# Patient Record
Sex: MALE | Race: WHITE | NOT HISPANIC OR LATINO | Employment: FULL TIME | URBAN - METROPOLITAN AREA
[De-identification: names, ages, dates, MRNs, and addresses within clinical notes are randomized per-mention and may not be internally consistent; named-entity substitution may affect disease eponyms.]

---

## 2024-09-25 ENCOUNTER — HOSPITAL ENCOUNTER (EMERGENCY)
Facility: CLINIC | Age: 34
Discharge: HOME | End: 2024-09-25
Attending: EMERGENCY MEDICINE | Admitting: EMERGENCY MEDICINE
Payer: COMMERCIAL

## 2024-09-25 VITALS
TEMPERATURE: 97.8 F | RESPIRATION RATE: 16 BRPM | WEIGHT: 315 LBS | OXYGEN SATURATION: 99 % | HEIGHT: 72 IN | HEART RATE: 80 BPM | DIASTOLIC BLOOD PRESSURE: 70 MMHG | SYSTOLIC BLOOD PRESSURE: 116 MMHG | BODY MASS INDEX: 42.66 KG/M2

## 2024-09-25 DIAGNOSIS — I48.91 ATRIAL FIBRILLATION WITH RAPID VENTRICULAR RESPONSE (H): ICD-10-CM

## 2024-09-25 LAB
ANION GAP SERPL CALCULATED.3IONS-SCNC: 13 MMOL/L (ref 7–15)
ATRIAL RATE - MUSE: 92 BPM
BASOPHILS # BLD AUTO: 0 10E3/UL (ref 0–0.2)
BASOPHILS NFR BLD AUTO: 0 %
BUN SERPL-MCNC: 19 MG/DL (ref 6–20)
CALCIUM SERPL-MCNC: 9 MG/DL (ref 8.8–10.4)
CHLORIDE SERPL-SCNC: 107 MMOL/L (ref 98–107)
CREAT SERPL-MCNC: 1.08 MG/DL (ref 0.67–1.17)
DIASTOLIC BLOOD PRESSURE - MUSE: NORMAL MMHG
EGFRCR SERPLBLD CKD-EPI 2021: >90 ML/MIN/1.73M2
EOSINOPHIL # BLD AUTO: 0.1 10E3/UL (ref 0–0.7)
EOSINOPHIL NFR BLD AUTO: 1 %
ERYTHROCYTE [DISTWIDTH] IN BLOOD BY AUTOMATED COUNT: 12.6 % (ref 10–15)
GLUCOSE SERPL-MCNC: 101 MG/DL (ref 70–99)
HCO3 SERPL-SCNC: 18 MMOL/L (ref 22–29)
HCT VFR BLD AUTO: 45.1 % (ref 40–53)
HGB BLD-MCNC: 14.9 G/DL (ref 13.3–17.7)
IMM GRANULOCYTES # BLD: 0.1 10E3/UL
IMM GRANULOCYTES NFR BLD: 1 %
INTERPRETATION ECG - MUSE: NORMAL
LYMPHOCYTES # BLD AUTO: 3.1 10E3/UL (ref 0.8–5.3)
LYMPHOCYTES NFR BLD AUTO: 32 %
MAGNESIUM SERPL-MCNC: 1.9 MG/DL (ref 1.7–2.3)
MCH RBC QN AUTO: 28 PG (ref 26.5–33)
MCHC RBC AUTO-ENTMCNC: 33 G/DL (ref 31.5–36.5)
MCV RBC AUTO: 85 FL (ref 78–100)
MONOCYTES # BLD AUTO: 0.8 10E3/UL (ref 0–1.3)
MONOCYTES NFR BLD AUTO: 9 %
NEUTROPHILS # BLD AUTO: 5.6 10E3/UL (ref 1.6–8.3)
NEUTROPHILS NFR BLD AUTO: 58 %
NRBC # BLD AUTO: 0 10E3/UL
NRBC BLD AUTO-RTO: 0 /100
P AXIS - MUSE: NORMAL DEGREES
PLATELET # BLD AUTO: 192 10E3/UL (ref 150–450)
POTASSIUM SERPL-SCNC: 4.2 MMOL/L (ref 3.4–5.3)
PR INTERVAL - MUSE: NORMAL MS
QRS DURATION - MUSE: 86 MS
QT - MUSE: 292 MS
QTC - MUSE: 378 MS
R AXIS - MUSE: 42 DEGREES
RBC # BLD AUTO: 5.33 10E6/UL (ref 4.4–5.9)
SODIUM SERPL-SCNC: 138 MMOL/L (ref 135–145)
SYSTOLIC BLOOD PRESSURE - MUSE: NORMAL MMHG
T AXIS - MUSE: 21 DEGREES
T4 FREE SERPL-MCNC: 0.94 NG/DL (ref 0.9–1.7)
TROPONIN T SERPL HS-MCNC: 6 NG/L
TROPONIN T SERPL HS-MCNC: 7 NG/L
TSH SERPL DL<=0.005 MIU/L-ACNC: 27.1 UIU/ML (ref 0.3–4.2)
VENTRICULAR RATE- MUSE: 101 BPM
WBC # BLD AUTO: 9.8 10E3/UL (ref 4–11)

## 2024-09-25 PROCEDURE — 99284 EMERGENCY DEPT VISIT MOD MDM: CPT | Mod: 25

## 2024-09-25 PROCEDURE — 83735 ASSAY OF MAGNESIUM: CPT | Performed by: EMERGENCY MEDICINE

## 2024-09-25 PROCEDURE — 96361 HYDRATE IV INFUSION ADD-ON: CPT

## 2024-09-25 PROCEDURE — 84484 ASSAY OF TROPONIN QUANT: CPT | Performed by: EMERGENCY MEDICINE

## 2024-09-25 PROCEDURE — 258N000003 HC RX IP 258 OP 636: Performed by: EMERGENCY MEDICINE

## 2024-09-25 PROCEDURE — 36415 COLL VENOUS BLD VENIPUNCTURE: CPT | Performed by: EMERGENCY MEDICINE

## 2024-09-25 PROCEDURE — 84439 ASSAY OF FREE THYROXINE: CPT | Performed by: EMERGENCY MEDICINE

## 2024-09-25 PROCEDURE — 80048 BASIC METABOLIC PNL TOTAL CA: CPT | Performed by: EMERGENCY MEDICINE

## 2024-09-25 PROCEDURE — 85004 AUTOMATED DIFF WBC COUNT: CPT | Performed by: EMERGENCY MEDICINE

## 2024-09-25 PROCEDURE — 250N000009 HC RX 250: Performed by: EMERGENCY MEDICINE

## 2024-09-25 PROCEDURE — 96376 TX/PRO/DX INJ SAME DRUG ADON: CPT

## 2024-09-25 PROCEDURE — 96374 THER/PROPH/DIAG INJ IV PUSH: CPT

## 2024-09-25 PROCEDURE — 93005 ELECTROCARDIOGRAM TRACING: CPT | Performed by: EMERGENCY MEDICINE

## 2024-09-25 PROCEDURE — 84443 ASSAY THYROID STIM HORMONE: CPT | Performed by: EMERGENCY MEDICINE

## 2024-09-25 RX ORDER — METOPROLOL TARTRATE 1 MG/ML
5 INJECTION, SOLUTION INTRAVENOUS
Status: DISPENSED | OUTPATIENT
Start: 2024-09-25 | End: 2024-09-25

## 2024-09-25 RX ORDER — METOPROLOL TARTRATE 25 MG/1
25 TABLET, FILM COATED ORAL DAILY
Qty: 7 TABLET | Refills: 0 | Status: SHIPPED | OUTPATIENT
Start: 2024-09-25 | End: 2024-10-02

## 2024-09-25 RX ADMIN — METOPROLOL TARTRATE 5 MG: 5 INJECTION INTRAVENOUS at 04:24

## 2024-09-25 RX ADMIN — METOPROLOL TARTRATE 5 MG: 5 INJECTION INTRAVENOUS at 04:37

## 2024-09-25 RX ADMIN — METOPROLOL TARTRATE 5 MG: 5 INJECTION INTRAVENOUS at 05:07

## 2024-09-25 RX ADMIN — SODIUM CHLORIDE 1000 ML: 9 INJECTION, SOLUTION INTRAVENOUS at 03:41

## 2024-09-25 ASSESSMENT — ACTIVITIES OF DAILY LIVING (ADL)
ADLS_ACUITY_SCORE: 35

## 2024-09-25 ASSESSMENT — COLUMBIA-SUICIDE SEVERITY RATING SCALE - C-SSRS
1. IN THE PAST MONTH, HAVE YOU WISHED YOU WERE DEAD OR WISHED YOU COULD GO TO SLEEP AND NOT WAKE UP?: NO
6. HAVE YOU EVER DONE ANYTHING, STARTED TO DO ANYTHING, OR PREPARED TO DO ANYTHING TO END YOUR LIFE?: NO
2. HAVE YOU ACTUALLY HAD ANY THOUGHTS OF KILLING YOURSELF IN THE PAST MONTH?: NO

## 2024-09-25 ASSESSMENT — ENCOUNTER SYMPTOMS
PALPITATIONS: 1
DIZZINESS: 1
SHORTNESS OF BREATH: 1

## 2024-09-25 NOTE — ED TRIAGE NOTES
Pt arrives to ed with c/o cp, sob and feels like heart is fluttering has hx of afib. Denies nausea. Has been going on for 3 days.      Triage Assessment (Adult)       Row Name 09/25/24 0305          Triage Assessment    Airway WDL WDL        Respiratory WDL    Respiratory WDL X;rhythm/pattern     Rhythm/Pattern, Respiratory shortness of breath        Skin Circulation/Temperature WDL    Skin Circulation/Temperature WDL WDL        Cardiac WDL    Cardiac WDL X;chest pain        Chest Pain Assessment    Chest Pain Location midsternal     Character stabbing     Precipitating Factors at rest        Cognitive/Neuro/Behavioral WDL    Cognitive/Neuro/Behavioral WDL WDL

## 2024-09-25 NOTE — DISCHARGE INSTRUCTIONS
I did place a referral for you for cardiology.  I did also give you the phone number above for the rapid access cardiac clinic.  Is important that you follow-up with them in the next 1 to 2 days.  You should be getting a call from cardiology to establish follow-up as well if you are unable to get through to the rapid access cardiac clinic.  Take the medications prescribed once a day.  Certainly if your palpitations get worse or you become more symptomatic at any time you should return to the emergency department for reevaluation.

## 2024-09-25 NOTE — PROGRESS NOTES
Patient discharged home with AVS. Vitals stable on RA. Will follow up with cardiology at discharge. Discharged home with wife.

## 2024-09-25 NOTE — ED PROVIDER NOTES
EMERGENCY DEPARTMENT ENCOUNTER      NAME: Eduin Pitts  AGE: 33 year old male  YOB: 1990  MRN: [id number]  EVALUATION DATE & TIME: No admission date for patient encounter.    PCP: Bulmaro Sanchez    ED PROVIDER: Candace Jeffries M.D.      Chief Complaint   Patient presents with    Chest Pain    Shortness of Breath         FINAL IMPRESSION:  1. Atrial fibrillation with rapid ventricular response (H)        MEDICAL DECISION MAKING:    Pertinent Labs & Imaging studies reviewed. (See chart for details)  ED Course as of 09/25/24 0620   Wed Sep 25, 2024   0314 Afebrile.  Vital signs here with tachycardia.  Patient is coming in with chest discomfort, palpitations, intermittent shortness of breath.  Ongoing for the last 3 days.  History of A-fib, last round was 2 years ago.  First diagnosed back in 2013.   0316 Reviewed ED's visit for A-fib from 8/24/2013.  States he has not seen cardiology.  Says normally when he gets these episodes that last for a day and it goes away on its own.  Not on any current medications.  No caffeine intake.    Exam for patient here shows tachycardia with irregularly irregular rhythm.  Otherwise unremarkable.    Patient's EKG here shows A-fib with RVR with multiple PVCs.  Rate 101.  No ST elevations or depressions.  Does have T wave inversions noted in lead III.  QTc here is 378, QRS 86.  As compared with previous EKG from 8/24/2013 A-fib at that time at a rate of 88.  T wave inversions in lead III were not present.  No PVCs noted at that time.   0436 Patient's labs back here with significant elevated TSH otherwise labs, electrolytes within normal limits.   0437 Given 1 dose of metoprolol, heart rate down to the 80s but still remains in atrial fibrillation.   0610 Patient's heart rate now more appropriate but continues with atrial fibrillation.  Did set up for a rapid access cardiac referral in the next 1 to 2 days.  His symptoms have resolved with his rate control.  He is  hemodynamically stable.  [id number] 0.    0612 Will plan to is appropriate with metoprolol and emergent cardiology follow-up.  Overall here he looks well, is not in need of urgent anticoagulations, symptoms have improved.  Do not feel he requires urgent hospitalization for issue but will require urgent follow-up with cardiology.         Medical Decision Making  Obtained supplemental history:Supplemental history obtained?: Documented in chart  Reviewed external records: External records reviewed?: Documented in chart  Care impacted by chronic illness:Other: Atrial fibrillation  Care significantly affected by social determinants of health:Access to Medical Care  Did you consider but not order tests?: Work up considered but not performed and documented in chart, if applicable  Did you interpret images independently?: Independent interpretation of ECG and images noted in documentation, when applicable.  Consultation discussion with other provider:Did you involve another provider (consultant, , pharmacy, etc.)?: No  Discharge. I prescribed additional prescription strength medication(s) as charted. I considered admission, but discharged the patient after share decision making conversation.  Not Applicable        Critical care: 0 minutes excluding separately billable procedures.  Includes bedside management, time reviewing test results, review of records, discussing the case with staff, documenting the medical record and time spent with family members (or surrogate decision makers) discussing specific treatment issues.          ED COURSE:  3:09 AM I met with the patient, obtained history, performed an initial exam, and discussed options and plan for diagnostics and treatment here in the ED.    The importance of close follow up was discussed. We reviewed warning signs and symptoms, and I instructed Mr. Pitts to return to the emergency department immediately if he develops any new or worsening symptoms. I provided  additional verbal discharge instructions. Mr. Pitts expressed understanding and agreement with this plan of care, his questions were answered, and he was discharged in stable condition.     MEDICATIONS GIVEN IN THE EMERGENCY:  Medications   metoprolol (LOPRESSOR) injection 5 mg (5 mg Intravenous $Given 9/25/24 0437)   metoprolol (LOPRESSOR) injection 5 mg (5 mg Intravenous $Given 9/25/24 0507)   sodium chloride 0.9% BOLUS 1,000 mL (0 mLs Intravenous Stopped 9/25/24 0506)       NEW PRESCRIPTIONS STARTED AT TODAY'S ER VISIT:  New Prescriptions    METOPROLOL TARTRATE (LOPRESSOR) 25 MG TABLET    Take 1 tablet (25 mg) by mouth daily for 7 days.          =================================================================    HPI    Patient information was obtained from: Patient    Use of : N/A         Eduin Pitts is a 33 year old male with a history of atrial fibrillation who presents to the ED via walk-in for the evaluation of chest pain and shortness of breath.    Patient reports palpitations that started three days ago. He reports associating shortness of breath, dizziness, and intermittent chest pain. He notes a history of atrial fibrillation. He is not currently on any medications for this. Patient states that his last episode of atrial fibrillation was about two years ago. He mentions that typically, his episodes will resolve within the day. However, current symptoms have persisted, prompting ED visit. Otherwise, he denies any caffeine use. No other complaints at this time.    REVIEW OF SYSTEMS   Review of Systems   Respiratory:  Positive for shortness of breath.    Cardiovascular:  Positive for chest pain and palpitations.   Neurological:  Positive for dizziness.   All other systems reviewed and are negative.    PAST MEDICAL HISTORY:  History reviewed. No pertinent past medical history.    PAST SURGICAL HISTORY:  History reviewed. No pertinent surgical history.    CURRENT MEDICATIONS:    No  current facility-administered medications for this encounter.    Current Outpatient Medications:     metoprolol tartrate (LOPRESSOR) 25 MG tablet, Take 1 tablet (25 mg) by mouth daily for 7 days., Disp: 7 tablet, Rfl: 0    ALLERGIES:  No Known Allergies    FAMILY HISTORY:  History reviewed. No pertinent family history.    SOCIAL HISTORY:   Social History     Socioeconomic History    Marital status: Single   Tobacco Use    Smoking status: Never    Smokeless tobacco: Never   Substance and Sexual Activity    Alcohol use: No    Drug use: No       PHYSICAL EXAM:    Vitals: /80   Pulse 82   Temp 97.8  F (36.6  C) (Oral)   Resp 23   Ht 1.829 m (6')   Wt (!) 172.4 kg (380 lb)   SpO2 97%   BMI 51.54 kg/m     General:. Alert and interactive, comfortable appearing.  HENT: Oropharynx without erythema or exudates. MMM.  TMs clear bilaterally.  Eyes: Pupils mid-sized and equally reactive.   Neck: Full AROM.  No midline tenderness to palpation.  Cardiovascular: Tachycardia with irregularly irregular rhythm. Peripheral pulses 2+ bilaterally.  Chest/Pulmonary: Normal work of breathing. Lung sounds clear and equal throughout, no wheezes or crackles. No chest wall tenderness or deformities.  Abdomen: Soft, nondistended. Nontender without guarding or rebound.  Back/Spine: No CVA or midline tenderness.  Extremities: Normal ROM of all major joints. No lower extremity edema.   Skin: Warm and dry. Normal skin color.   Neuro: Speech clear. CNs grossly intact. Moves all extremities appropriately. Strength and sensation grossly intact to all extremities.   Psych: Normal affect/mood, cooperative, memory appropriate.     LAB:  All pertinent labs reviewed and interpreted.  Labs Ordered and Resulted from Time of ED Arrival to Time of ED Departure   BASIC METABOLIC PANEL - Abnormal       Result Value    Sodium 138      Potassium 4.2      Chloride 107      Carbon Dioxide (CO2) 18 (*)     Anion Gap 13      Urea Nitrogen 19.0       Creatinine 1.08      GFR Estimate >90      Calcium 9.0      Glucose 101 (*)    TSH WITH FREE T4 REFLEX - Abnormal    TSH 27.10 (*)    TROPONIN T, HIGH SENSITIVITY - Normal    Troponin T, High Sensitivity 7     MAGNESIUM - Normal    Magnesium 1.9     T4 FREE - Normal    Free T4 0.94     TROPONIN T, HIGH SENSITIVITY - Normal    Troponin T, High Sensitivity 6     CBC WITH PLATELETS AND DIFFERENTIAL    WBC Count 9.8      RBC Count 5.33      Hemoglobin 14.9      Hematocrit 45.1      MCV 85      MCH 28.0      MCHC 33.0      RDW 12.6      Platelet Count 192      % Neutrophils 58      % Lymphocytes 32      % Monocytes 9      % Eosinophils 1      % Basophils 0      % Immature Granulocytes 1      NRBCs per 100 WBC 0      Absolute Neutrophils 5.6      Absolute Lymphocytes 3.1      Absolute Monocytes 0.8      Absolute Eosinophils 0.1      Absolute Basophils 0.0      Absolute Immature Granulocytes 0.1      Absolute NRBCs 0.0         RADIOLOGY:  No orders to display       EKG:  See MDM  I have independently reviewed and interpreted the EKG(s) documented above.           I, Denisha Sheppard, am serving as a scribe to document services personally performed by Dr. Candace Jeffries  based on my observation and the provider's statements to me. I, Candace Jeffries MD attest that Denisha Sheppard is acting in a scribe capacity, has observed my performance of the services and has documented them in accordance with my direction.      Candace Jeffries M.D.  Emergency Medicine  [facility] EMERGENCY ROOM  [address] [address]  [phone]  Dept: [phone]     Candace Jeffries MD  09/25/24 0620

## 2024-09-26 ENCOUNTER — OFFICE VISIT (OUTPATIENT)
Dept: CARDIOLOGY | Facility: CLINIC | Age: 34
End: 2024-09-26
Attending: EMERGENCY MEDICINE
Payer: COMMERCIAL

## 2024-09-26 VITALS
SYSTOLIC BLOOD PRESSURE: 120 MMHG | HEIGHT: 72 IN | HEART RATE: 104 BPM | BODY MASS INDEX: 42.66 KG/M2 | OXYGEN SATURATION: 95 % | DIASTOLIC BLOOD PRESSURE: 82 MMHG | WEIGHT: 315 LBS

## 2024-09-26 DIAGNOSIS — I48.91 ATRIAL FIBRILLATION WITH RAPID VENTRICULAR RESPONSE (H): ICD-10-CM

## 2024-09-26 PROCEDURE — 99205 OFFICE O/P NEW HI 60 MIN: CPT | Performed by: INTERNAL MEDICINE

## 2024-09-26 RX ORDER — METOPROLOL SUCCINATE 50 MG/1
50 TABLET, EXTENDED RELEASE ORAL DAILY
Qty: 30 TABLET | Refills: 3 | Status: SHIPPED | OUTPATIENT
Start: 2024-09-26

## 2024-09-26 NOTE — LETTER
"9/26/2024    Physician No Ref-Primary  No address on file    RE: Eduin Pitts       Dear Colleague,     I had the pleasure of seeing Eduin Pitts in the [facility].  HPI and Plan:   I the pleasure of meeting Mr. Pitts today he is a 33-year-old gentleman about to be 34 he is referred in from the emergency room for atrial fibrillation he has a history of atrial fibrillation going back to about 2010 or 2013 he had seen a cardiologist in the past I am not sure of his complete workup I believe he probably had an echocardiogram.  He has had 4 episodes of atrial fib since that time he was just in the emergency room and his episode started probably 4 days before the ER visit.  He was noted to be in somewhat rapid atrial fibrillation because it had been 4 days they did not cardiovert and they gave him a prescription for beta-blocker but he did not start it yet he is then referred and now.  He notes palpitations no dizziness or syncope.  There was no obvious trigger of note reviewing his labs he has compensated hypothyroid so I would refer him to an internist for that but that is probably not contributing to the atrial fib he does have morbid obesity and he has a history of \"mild obstructive sleep apnea\" which may be a trigger he does not drink alcohol to any degree he does note that caffeine sometimes might trigger his palpitations but he does not drink caffeinated products much anymore    On exam today he has morbid obesity which could contribute to sleep apnea and he has significant peripheral edema despite the fact that he had varicose vein ablation.  I think the most expeditious way to treat them is to have him come into the hospital electively his chads vascular score is 0 but since he has been in atrial fibs for more than 4 days now I would recommend we place him on metoprolol XL 50 mg daily and Eliquis.  Will schedule him for transesophageal echo and electrical cardioversion as " indicated.    Will then see him back in about 3 weeks and make sure he is maintaining sinus rhythm.  At some point I would like to do an overnight oximetry just to look for screening test for hypoxia and sleep apnea but rather wait till he is in sinus rhythm will also need to check for coronary artery disease at some point since he states he might have high cholesterol he is not sure how high it was.  The transesophageal echo will obviously be looking for valve function cardiomyopathy etc.    We may opt for the pill in a pocket with flecainide 300 mg as needed and beta-blocker in the future but I want to make sure that the echo shows normal heart function the stress test was negative for ischemia.  As mentioned above we will go and refer him to an internist for his compensated hypothyroid we will get a fasting lipid profile at some future date    Risk-benefit indication for transesophageal echo and cardioversion were discussed with the patient including the shock in the heart rhythm heart pauses. He knows he will be getting anesthesia and has to have his wife present with him that day and he cannot drive afterwards    Complex visit today 1 hour visit we reviewed the ER blood work EKG to history physical exam and outline a complex plan we discussed other options such as prolonged anticoagulation and then cardioversion in 4 weeks if no change we talked about different blood thinners including Coumadin versus Eliquis if Eliquis is not covered we could consider Pradaxa but I like to get him on a medicine that is quicker acting if we plan on doing the cardioversion the next day or 2 and Coumadin would not fit that bill    Orders Placed This Encounter   Procedures     Follow-Up with Cardiology DONELL     EKG 12-lead complete w/read - Clinics (to be scheduled)     Cardioversion     Transesophageal Echocardiogram     Orders Placed This Encounter   Medications     metoprolol succinate ER (TOPROL XL) 50 MG 24 hr tablet     Sig:  Take 1 tablet (50 mg) by mouth daily.     Dispense:  30 tablet     Refill:  3     apixaban ANTICOAGULANT (ELIQUIS ANTICOAGULANT) 5 MG tablet     Sig: Take 1 tablet (5 mg) by mouth 2 times daily.     Dispense:  60 tablet     Refill:  2     There are no discontinued medications.      Encounter Diagnosis   Name Primary?     Atrial fibrillation with rapid ventricular response (H)        CURRENT MEDICATIONS:  Current Outpatient Medications   Medication Sig Dispense Refill     apixaban ANTICOAGULANT (ELIQUIS ANTICOAGULANT) 5 MG tablet Take 1 tablet (5 mg) by mouth 2 times daily. 60 tablet 2     metoprolol succinate ER (TOPROL XL) 50 MG 24 hr tablet Take 1 tablet (50 mg) by mouth daily. 30 tablet 3     metoprolol tartrate (LOPRESSOR) 25 MG tablet Take 1 tablet (25 mg) by mouth daily for 7 days. 7 tablet 0       ALLERGIES   No Known Allergies    PAST MEDICAL HISTORY:  Past Medical History:   Diagnosis Date     Compensated hypothyroidism      Hyperlipidemia LDL goal <130      Morbid obesity (H)      CURT (obstructive sleep apnea)     mild no treatment     Paroxysmal atrial fibrillation (H)        PAST SURGICAL HISTORY:  Past Surgical History:   Procedure Laterality Date     AS EXCISION OF UVULA       LASER ABLATION VEIN VARICOSE Bilateral     vs heat to close bilateral lower ext varicose vein, no h/o DVT     TONSILLECTOMY       wisdom teeth         FAMILY HISTORY:  Family History   Problem Relation Age of Onset     Diabetes Mother        SOCIAL HISTORY:  Social History     Socioeconomic History     Marital status: Single     Spouse name: None     Number of children: None     Years of education: None     Highest education level: None   Tobacco Use     Smoking status: Never     Smokeless tobacco: Never   Substance and Sexual Activity     Alcohol use: Not Currently     Comment: rare     Drug use: No     Comment: caffeine  none       Review of Systems:  Skin:        Eyes:       ENT:       Respiratory:       Cardiovascular:      "  Gastroenterology:      Genitourinary:       Musculoskeletal:       Neurologic:       Psychiatric:       Heme/Lymph/Imm:       Endocrine:         Physical Exam:  Vitals: /82   Pulse 104   Ht 1.829 m (6')   Wt (!) 170.6 kg (376 lb)   SpO2 95%   BMI 50.99 kg/m        Constitutional:  cooperative, alert and oriented, well developed, well nourished, in no acute distress        Skin:        marked hemosiderin deposits from VV  lower ext    Head:  normocephalic, no masses or lesions        Eyes:  pupils equal and round, conjunctivae and lids unremarkable, sclera white, no xanthalasma, EOMS intact, no nystagmus        Lymph:No Cervical lymphadenopathy present;No thyromegaly     ENT:           Neck:  carotid pulses are full and equal bilaterally, JVP normal, no carotid bruit        Respiratory:  normal breath sounds, clear to auscultation, normal A-P diameter, normal symmetry, normal respiratory excursion, no use of accessory muscles         Cardiac:   irregularly irregular rhythm   no presence of murmur                                              normal pulses except due to BMI and leg swelling difficult to feel fem and dp/pt pulses, no bruits    GI:  abdomen soft, non-tender, BS normoactive, no mass, no HSM, no bruits obese very difficult exam due to pannus    Extremities and Muscular Skeletal:            bilat LE erythema   no infection  significant swelling    Neurological:  no gross motor deficits        Psych:  Alert and Oriented x 3      Recent Lab Results:  LIPID RESULTS:  No results found for: \"CHOL\", \"HDL\", \"LDL\", \"TRIG\", \"CHOLHDLRATIO\"    LIVER ENZYME RESULTS:  Lab Results   Component Value Date    AST 26 04/20/2016    ALT 40 04/20/2016       CBC RESULTS:  Lab Results   Component Value Date    WBC 9.8 09/25/2024    WBC 10.0 04/20/2016    RBC 5.33 09/25/2024    RBC 5.17 04/20/2016    HGB 14.9 09/25/2024    HGB 15.7 04/20/2016    HCT 45.1 09/25/2024    HCT 45.5 04/20/2016    MCV 85 09/25/2024    MCV 88 " "04/20/2016    MCH 28.0 09/25/2024    MCH 30.4 04/20/2016    MCHC 33.0 09/25/2024    MCHC 34.5 04/20/2016    RDW 12.6 09/25/2024    RDW 11.6 04/20/2016     09/25/2024     04/20/2016       BMP RESULTS:  Lab Results   Component Value Date     09/25/2024     04/20/2016    POTASSIUM 4.2 09/25/2024    POTASSIUM 3.8 04/20/2016    CHLORIDE 107 09/25/2024    CHLORIDE 107 04/20/2016    CO2 18 (L) 09/25/2024    CO2 31 04/20/2016    ANIONGAP 13 09/25/2024    ANIONGAP 2 (L) 04/20/2016     (H) 09/25/2024    GLC 91 04/20/2016    BUN 19.0 09/25/2024    BUN 17 04/20/2016    CR 1.08 09/25/2024    CR 0.84 04/20/2016    GFRESTIMATED >90 09/25/2024    GFRESTIMATED >90  Non  GFR Calc   04/20/2016    GFRESTBLACK >90   GFR Calc   04/20/2016    KAYLEIGH 9.0 09/25/2024    KAYLEIGH 8.8 04/20/2016        A1C RESULTS:  No results found for: \"A1C\"    INR RESULTS:  Lab Results   Component Value Date    INR 1.23 (H) 04/20/2016    INR 1.20 08/24/2013           CC  Candace Jeffries MD  [facility]  [address] [place] [id number]      Thank you for allowing me to participate in the care of your patient.      Sincerely,     Tim Munoz MD     [facility]  cc:   Candace Jeffries MD  [facility]  [address] [place] [id number]      "

## 2024-09-26 NOTE — PROGRESS NOTES
"HPI and Plan:   I the pleasure of meeting Mr. Pitts today he is a 33-year-old gentleman about to be 34 he is referred in from the emergency room for atrial fibrillation he has a history of atrial fibrillation going back to about 2010 or 2013 he had seen a cardiologist in the past I am not sure of his complete workup I believe he probably had an echocardiogram.  He has had 4 episodes of atrial fib since that time he was just in the emergency room and his episode started probably 4 days before the ER visit.  He was noted to be in somewhat rapid atrial fibrillation because it had been 4 days they did not cardiovert and they gave him a prescription for beta-blocker but he did not start it yet he is then referred and now.  He notes palpitations no dizziness or syncope.  There was no obvious trigger of note reviewing his labs he has compensated hypothyroid so I would refer him to an internist for that but that is probably not contributing to the atrial fib he does have morbid obesity and he has a history of \"mild obstructive sleep apnea\" which may be a trigger he does not drink alcohol to any degree he does note that caffeine sometimes might trigger his palpitations but he does not drink caffeinated products much anymore    On exam today he has morbid obesity which could contribute to sleep apnea and he has significant peripheral edema despite the fact that he had varicose vein ablation.  I think the most expeditious way to treat them is to have him come into the hospital electively his chads vascular score is 0 but since he has been in atrial fibs for more than 4 days now I would recommend we place him on metoprolol XL 50 mg daily and Eliquis.  Will schedule him for transesophageal echo and electrical cardioversion as indicated.    Will then see him back in about 3 weeks and make sure he is maintaining sinus rhythm.  At some point I would like to do an overnight oximetry just to look for screening test for hypoxia " and sleep apnea but rather wait till he is in sinus rhythm will also need to check for coronary artery disease at some point since he states he might have high cholesterol he is not sure how high it was.  The transesophageal echo will obviously be looking for valve function cardiomyopathy etc.    We may opt for the pill in a pocket with flecainide 300 mg as needed and beta-blocker in the future but I want to make sure that the echo shows normal heart function the stress test was negative for ischemia.  As mentioned above we will go and refer him to an internist for his compensated hypothyroid we will get a fasting lipid profile at some future date    Risk-benefit indication for transesophageal echo and cardioversion were discussed with the patient including the shock in the heart rhythm heart pauses. He knows he will be getting anesthesia and has to have his wife present with him that day and he cannot drive afterwards    Complex visit today 1 hour visit we reviewed the ER blood work EKG to history physical exam and outline a complex plan we discussed other options such as prolonged anticoagulation and then cardioversion in 4 weeks if no change we talked about different blood thinners including Coumadin versus Eliquis if Eliquis is not covered we could consider Pradaxa but I like to get him on a medicine that is quicker acting if we plan on doing the cardioversion the next day or 2 and Coumadin would not fit that bill    Orders Placed This Encounter   Procedures    Follow-Up with Cardiology [facility]    EKG 12-lead complete w/read - Clinics (to be scheduled)    Cardioversion    Transesophageal Echocardiogram     Orders Placed This Encounter   Medications    metoprolol succinate ER (TOPROL XL) 50 MG 24 hr tablet     Sig: Take 1 tablet (50 mg) by mouth daily.     Dispense:  30 tablet     Refill:  3    apixaban ANTICOAGULANT (ELIQUIS ANTICOAGULANT) 5 MG tablet     Sig: Take 1 tablet (5 mg) by mouth 2 times daily.      Dispense:  60 tablet     Refill:  2     There are no discontinued medications.      Encounter Diagnosis   Name Primary?    Atrial fibrillation with rapid ventricular response (H)        CURRENT MEDICATIONS:  Current Outpatient Medications   Medication Sig Dispense Refill    apixaban ANTICOAGULANT (ELIQUIS ANTICOAGULANT) 5 MG tablet Take 1 tablet (5 mg) by mouth 2 times daily. 60 tablet 2    metoprolol succinate ER (TOPROL XL) 50 MG 24 hr tablet Take 1 tablet (50 mg) by mouth daily. 30 tablet 3    metoprolol tartrate (LOPRESSOR) 25 MG tablet Take 1 tablet (25 mg) by mouth daily for 7 days. 7 tablet 0       ALLERGIES   No Known Allergies    PAST MEDICAL HISTORY:  Past Medical History:   Diagnosis Date    Compensated hypothyroidism     Hyperlipidemia LDL goal <130     Morbid obesity (H)     CURT (obstructive sleep apnea)     mild no treatment    Paroxysmal atrial fibrillation (H)        PAST SURGICAL HISTORY:  Past Surgical History:   Procedure Laterality Date    AS EXCISION OF UVULA      LASER ABLATION VEIN VARICOSE Bilateral     vs heat to close bilateral lower ext varicose vein, no h/o DVT    TONSILLECTOMY      wisdom teeth         FAMILY HISTORY:  Family History   Problem Relation Age of Onset    Diabetes Mother        SOCIAL HISTORY:  Social History     Socioeconomic History    Marital status: Single     Spouse name: None    Number of children: None    Years of education: None    Highest education level: None   Tobacco Use    Smoking status: Never    Smokeless tobacco: Never   Substance and Sexual Activity    Alcohol use: Not Currently     Comment: rare    Drug use: No     Comment: caffeine  none       Review of Systems:  Skin:        Eyes:       ENT:       Respiratory:       Cardiovascular:       Gastroenterology:      Genitourinary:       Musculoskeletal:       Neurologic:       Psychiatric:       Heme/Lymph/Imm:       Endocrine:         Physical Exam:  Vitals: /82   Pulse 104   Ht 1.829 m (6')   Wt (!)  "170.6 kg (376 lb)   SpO2 95%   BMI 50.99 kg/m        Constitutional:  cooperative, alert and oriented, well developed, well nourished, in no acute distress        Skin:        marked hemosiderin deposits from VV  lower ext    Head:  normocephalic, no masses or lesions        Eyes:  pupils equal and round, conjunctivae and lids unremarkable, sclera white, no xanthalasma, EOMS intact, no nystagmus        Lymph:No Cervical lymphadenopathy present;No thyromegaly     ENT:           Neck:  carotid pulses are full and equal bilaterally, JVP normal, no carotid bruit        Respiratory:  normal breath sounds, clear to auscultation, normal A-P diameter, normal symmetry, normal respiratory excursion, no use of accessory muscles         Cardiac:   irregularly irregular rhythm   no presence of murmur                                              normal pulses except due to BMI and leg swelling difficult to feel fem and dp/pt pulses, no bruits    GI:  abdomen soft, non-tender, BS normoactive, no mass, no HSM, no bruits obese very difficult exam due to pannus    Extremities and Muscular Skeletal:            bilat LE erythema   no infection  significant swelling    Neurological:  no gross motor deficits        Psych:  Alert and Oriented x 3      Recent Lab Results:  LIPID RESULTS:  No results found for: \"CHOL\", \"HDL\", \"LDL\", \"TRIG\", \"CHOLHDLRATIO\"    LIVER ENZYME RESULTS:  Lab Results   Component Value Date    AST 26 04/20/2016    ALT 40 04/20/2016       CBC RESULTS:  Lab Results   Component Value Date    WBC 9.8 09/25/2024    WBC 10.0 04/20/2016    RBC 5.33 09/25/2024    RBC 5.17 04/20/2016    HGB 14.9 09/25/2024    HGB 15.7 04/20/2016    HCT 45.1 09/25/2024    HCT 45.5 04/20/2016    MCV 85 09/25/2024    MCV 88 04/20/2016    MCH 28.0 09/25/2024    MCH 30.4 04/20/2016    MCHC 33.0 09/25/2024    MCHC 34.5 04/20/2016    RDW 12.6 09/25/2024    RDW 11.6 04/20/2016     09/25/2024     04/20/2016       BMP RESULTS:  Lab " "Results   Component Value Date     09/25/2024     04/20/2016    POTASSIUM 4.2 09/25/2024    POTASSIUM 3.8 04/20/2016    CHLORIDE 107 09/25/2024    CHLORIDE 107 04/20/2016    CO2 18 (L) 09/25/2024    CO2 31 04/20/2016    ANIONGAP 13 09/25/2024    ANIONGAP 2 (L) 04/20/2016     (H) 09/25/2024    GLC 91 04/20/2016    BUN 19.0 09/25/2024    BUN 17 04/20/2016    CR 1.08 09/25/2024    CR 0.84 04/20/2016    GFRESTIMATED >90 09/25/2024    GFRESTIMATED >90  Non  GFR Calc   04/20/2016    GFRESTBLACK >90   GFR Calc   04/20/2016    KAYLEIGH 9.0 09/25/2024    KAYLEIGH 8.8 04/20/2016        A1C RESULTS:  No results found for: \"A1C\"    INR RESULTS:  Lab Results   Component Value Date    INR 1.23 (H) 04/20/2016    INR 1.20 08/24/2013           CC  Candace Jeffries MD  [facility]  [address] [place] [address]  "

## 2024-10-01 ENCOUNTER — HOSPITAL ENCOUNTER (OUTPATIENT)
Facility: CLINIC | Age: 34
End: 2024-10-01
Payer: COMMERCIAL

## 2024-10-09 ENCOUNTER — TELEPHONE (OUTPATIENT)
Dept: CARDIOLOGY | Facility: CLINIC | Age: 34
End: 2024-10-09
Payer: COMMERCIAL

## 2024-10-09 NOTE — TELEPHONE ENCOUNTER
Called Pt to go over cardioversion /KATHY instructions. Pt states he believes he is back in NSR. Pt lives in [place] and does not have a primary DR to be able to go and get EKG anywhere, and no watch to check EKG. Informed him would message provider for recommendations, and Pt is to stay on blood thinner until provider makes recommendations. DONOVAN Villarreal RN

## 2024-10-10 NOTE — TELEPHONE ENCOUNTER
Called Pt he has maintained NSR. Informed him to stay on medications, until he sees NP for further discussion, and messaged scheduling if there is earlier appointment, and cancel procedures. DONOVAN Villarreal RN

## 2024-10-30 ENCOUNTER — OFFICE VISIT (OUTPATIENT)
Dept: FAMILY MEDICINE | Facility: CLINIC | Age: 34
End: 2024-10-30
Payer: COMMERCIAL

## 2024-10-30 VITALS
OXYGEN SATURATION: 96 % | DIASTOLIC BLOOD PRESSURE: 76 MMHG | SYSTOLIC BLOOD PRESSURE: 132 MMHG | HEART RATE: 70 BPM | WEIGHT: 315 LBS | TEMPERATURE: 98.7 F | RESPIRATION RATE: 16 BRPM | HEIGHT: 72 IN | BODY MASS INDEX: 42.66 KG/M2

## 2024-10-30 DIAGNOSIS — Z00.00 ROUTINE GENERAL MEDICAL EXAMINATION AT A HEALTH CARE FACILITY: Primary | ICD-10-CM

## 2024-10-30 DIAGNOSIS — I48.91 ATRIAL FIBRILLATION WITH RAPID VENTRICULAR RESPONSE (H): ICD-10-CM

## 2024-10-30 DIAGNOSIS — Z13.220 LIPID SCREENING: ICD-10-CM

## 2024-10-30 DIAGNOSIS — I10 ESSENTIAL (PRIMARY) HYPERTENSION: ICD-10-CM

## 2024-10-30 DIAGNOSIS — R60.0 BILATERAL LEG EDEMA: ICD-10-CM

## 2024-10-30 DIAGNOSIS — E03.9 ACQUIRED HYPOTHYROIDISM: ICD-10-CM

## 2024-10-30 DIAGNOSIS — R73.09 ELEVATED GLUCOSE: ICD-10-CM

## 2024-10-30 PROCEDURE — 99214 OFFICE O/P EST MOD 30 MIN: CPT | Mod: 25 | Performed by: NURSE PRACTITIONER

## 2024-10-30 PROCEDURE — 99385 PREV VISIT NEW AGE 18-39: CPT | Performed by: NURSE PRACTITIONER

## 2024-10-30 RX ORDER — LEVOTHYROXINE SODIUM 50 UG/1
50 TABLET ORAL DAILY
Qty: 90 TABLET | Refills: 0 | Status: SHIPPED | OUTPATIENT
Start: 2024-10-30

## 2024-10-30 SDOH — HEALTH STABILITY: PHYSICAL HEALTH: ON AVERAGE, HOW MANY MINUTES DO YOU ENGAGE IN EXERCISE AT THIS LEVEL?: 20 MIN

## 2024-10-30 SDOH — HEALTH STABILITY: PHYSICAL HEALTH: ON AVERAGE, HOW MANY DAYS PER WEEK DO YOU ENGAGE IN MODERATE TO STRENUOUS EXERCISE (LIKE A BRISK WALK)?: 2 DAYS

## 2024-10-30 ASSESSMENT — ANXIETY QUESTIONNAIRES
7. FEELING AFRAID AS IF SOMETHING AWFUL MIGHT HAPPEN: NOT AT ALL
GAD7 TOTAL SCORE: 0
GAD7 TOTAL SCORE: 0
IF YOU CHECKED OFF ANY PROBLEMS ON THIS QUESTIONNAIRE, HOW DIFFICULT HAVE THESE PROBLEMS MADE IT FOR YOU TO DO YOUR WORK, TAKE CARE OF THINGS AT HOME, OR GET ALONG WITH OTHER PEOPLE: NOT DIFFICULT AT ALL
3. WORRYING TOO MUCH ABOUT DIFFERENT THINGS: NOT AT ALL
7. FEELING AFRAID AS IF SOMETHING AWFUL MIGHT HAPPEN: NOT AT ALL
8. IF YOU CHECKED OFF ANY PROBLEMS, HOW DIFFICULT HAVE THESE MADE IT FOR YOU TO DO YOUR WORK, TAKE CARE OF THINGS AT HOME, OR GET ALONG WITH OTHER PEOPLE?: NOT DIFFICULT AT ALL
2. NOT BEING ABLE TO STOP OR CONTROL WORRYING: NOT AT ALL
5. BEING SO RESTLESS THAT IT IS HARD TO SIT STILL: NOT AT ALL
1. FEELING NERVOUS, ANXIOUS, OR ON EDGE: NOT AT ALL
4. TROUBLE RELAXING: NOT AT ALL
GAD7 TOTAL SCORE: 0
6. BECOMING EASILY ANNOYED OR IRRITABLE: NOT AT ALL

## 2024-10-30 ASSESSMENT — PATIENT HEALTH QUESTIONNAIRE - PHQ9
SUM OF ALL RESPONSES TO PHQ QUESTIONS 1-9: 0
SUM OF ALL RESPONSES TO PHQ QUESTIONS 1-9: 0
10. IF YOU CHECKED OFF ANY PROBLEMS, HOW DIFFICULT HAVE THESE PROBLEMS MADE IT FOR YOU TO DO YOUR WORK, TAKE CARE OF THINGS AT HOME, OR GET ALONG WITH OTHER PEOPLE: NOT DIFFICULT AT ALL

## 2024-10-30 ASSESSMENT — PAIN SCALES - GENERAL: PAINLEVEL_OUTOF10: NO PAIN (0)

## 2024-10-30 ASSESSMENT — SOCIAL DETERMINANTS OF HEALTH (SDOH): HOW OFTEN DO YOU GET TOGETHER WITH FRIENDS OR RELATIVES?: ONCE A WEEK

## 2024-10-30 NOTE — PATIENT INSTRUCTIONS
Patient Education   Preventive Care Advice   This is general advice given by our system to help you stay healthy. However, your care team may have specific advice just for you. Please talk to your care team about your preventive care needs.  Nutrition  Eat 5 or more servings of fruits and vegetables each day.  Try wheat bread, brown rice and whole grain pasta (instead of white bread, rice, and pasta).  Get enough calcium and vitamin D. Check the label on foods and aim for 100% of the RDA (recommended daily allowance).  Lifestyle  Exercise at least 150 minutes each week  (30 minutes a day, 5 days a week).  Do muscle strengthening activities 2 days a week. These help control your weight and prevent disease.  No smoking.  Wear sunscreen to prevent skin cancer.  Have a dental exam and cleaning every 6 months.  Yearly exams  See your health care team every year to talk about:  Any changes in your health.  Any medicines your care team has prescribed.  Preventive care, family planning, and ways to prevent chronic diseases.  Shots (vaccines)   HPV shots (up to age 26), if you've never had them before.  Hepatitis B shots (up to age 59), if you've never had them before.  COVID-19 shot: Get this shot when it's due.  Flu shot: Get a flu shot every year.  Tetanus shot: Get a tetanus shot every 10 years.  Pneumococcal, hepatitis A, and RSV shots: Ask your care team if you need these based on your risk.  Shingles shot (for age 50 and up)  General health tests  Diabetes screening:  Starting at age 35, Get screened for diabetes at least every 3 years.  If you are younger than age 35, ask your care team if you should be screened for diabetes.  Cholesterol test: At age 39, start having a cholesterol test every 5 years, or more often if advised.  Bone density scan (DEXA): At age 50, ask your care team if you should have this scan for osteoporosis (brittle bones).  Hepatitis C: Get tested at least once in your life.  STIs (sexually  transmitted infections)  Before age 24: Ask your care team if you should be screened for STIs.  After age 24: Get screened for STIs if you're at risk. You are at risk for STIs (including HIV) if:  You are sexually active with more than one person.  You don't use condoms every time.  You or a partner was diagnosed with a sexually transmitted infection.  If you are at risk for HIV, ask about PrEP medicine to prevent HIV.  Get tested for HIV at least once in your life, whether you are at risk for HIV or not.  Cancer screening tests  Cervical cancer screening: If you have a cervix, begin getting regular cervical cancer screening tests starting at age 21.  Breast cancer scan (mammogram): If you've ever had breasts, begin having regular mammograms starting at age 40. This is a scan to check for breast cancer.  Colon cancer screening: It is important to start screening for colon cancer at age 45.  Have a colonoscopy test every 10 years (or more often if you're at risk) Or, ask your provider about stool tests like a FIT test every year or Cologuard test every 3 years.  To learn more about your testing options, visit:   .  For help making a decision, visit:   https://bit.ly/oe51994.  Prostate cancer screening test: If you have a prostate, ask your care team if a prostate cancer screening test (PSA) at age 55 is right for you.  Lung cancer screening: If you are a current or former smoker ages 50 to 80, ask your care team if ongoing lung cancer screenings are right for you.  For informational purposes only. Not to replace the advice of your health care provider. Copyright   2023 [facility] [employer]. All rights reserved. Clinically reviewed by the [facility] Transitions Program. [employer] [id number] - REV 01/24.     Lab only appointment in 2 months to recheck thyroid     [phone]

## 2024-10-30 NOTE — PROGRESS NOTES
Preventive Care Visit  [facility] IGNNY De La Rosa CNP, Nurse Practitioner Primary Care  Oct 30, 2024        Tita Denny is a 34 year old, presenting for the following:  Physical (Labs done at ER, A-Fib 10/2024. [place]. Please review)        10/30/2024    12:41 PM   Additional Questions   Roomed by  LPN          Healthy Habits:     Getting at least 3 servings of Calcium per day:  Yes    Bi-annual eye exam:  Yes    Dental care twice a year:  Yes    Sleep apnea or symptoms of sleep apnea:  Sleep apnea    Diet:  Regular (no restrictions)    Frequency of exercise:  6-7 days/week    Duration of exercise:  15-30 minutes    Taking medications regularly:  Yes    Barriers to taking medications:  Not applicable    Medication side effects:  Not applicable    Additional concerns today:  Yes (Review Labs from ER visit)    Had blood work in ER last month when had atrial fibrillation.  TSH was 27.10.  Heart back in normal rhythm.  Legs swell and are discolored- had vein procedure done in [place] about 4 years ago.     Health Care Directive  Patient does not have a Health Care Directive: Discussed advance care planning with patient; however, patient declined at this time.      10/30/2024   General Health   How would you rate your overall physical health? Good   Feel stress (tense, anxious, or unable to sleep) Not at all            10/30/2024   Nutrition   Three or more servings of calcium each day? Yes   Diet: Regular (no restrictions)   How many servings of fruit and vegetables per day? (!) 0-1   How many sweetened beverages each day? (!) 2            10/30/2024   Exercise   Days per week of moderate/strenous exercise 2 days   Average minutes spent exercising at this level 20 min      (!) EXERCISE CONCERN      10/30/2024   Social Factors   Frequency of gathering with friends or relatives Once a week   Worry food won't last until get money to buy more No   Food not last or not have enough money  for food? No   Do you have housing? (Housing is defined as stable permanent housing and does not include staying ouside in a car, in a tent, in an abandoned building, in an overnight shelter, or couch-surfing.) Yes   Are you worried about losing your housing? No   Lack of transportation? No   Unable to get utilities (heat,electricity)? No            10/30/2024   Dental   Dentist two times every year? Yes            10/30/2024   TB Screening   Were you born outside of the [place]? No          Today's PHQ-9 Score:       10/30/2024    11:17 AM   PHQ-9 SCORE   PHQ-9 Total Score MyChart 0   PHQ-9 Total Score 0        Patient-reported         10/30/2024   Substance Use   Alcohol more than 3/day or more than 7/wk Not Applicable   Do you use any other substances recreationally? No        Social History     Tobacco Use    Smoking status: Never     Passive exposure: Never    Smokeless tobacco: Never   Vaping Use    Vaping status: Never Used   Substance Use Topics    Alcohol use: Not Currently     Comment: rare    Drug use: No     Comment: caffeine  none             10/30/2024   One time HIV Screening   Previous HIV test? I don't know          10/30/2024   STI Screening   New sexual partner(s) since last STI/HIV test? No            10/30/2024   Contraception/Family Planning   Questions about contraception or family planning No           Reviewed and updated as needed this visit by Provider                    Past Medical History:   Diagnosis Date    Compensated hypothyroidism     Hyperlipidemia LDL goal <130     Morbid obesity (H)     CURT (obstructive sleep apnea)     mild no treatment    Paroxysmal atrial fibrillation (H)      Past Surgical History:   Procedure Laterality Date    AS EXCISION OF UVULA      LASER ABLATION VEIN VARICOSE Bilateral     vs heat to close bilateral lower ext varicose vein, no h/o DVT    TONSILLECTOMY      wisdom teeth       Labs reviewed in EPIC      Review of Systems  Constitutional, HEENT,  "cardiovascular, pulmonary, GI, , musculoskeletal, neuro, skin, endocrine and psych systems are negative, except as otherwise noted.  Leg edema, fatigue.      Objective    Exam  /76 (BP Location: Left arm, Patient Position: Sitting, Cuff Size: Adult Large)   Pulse 70   Temp 98.7  F (37.1  C) (Oral)   Resp 16   Ht 1.816 m (5' 11.5\")   Wt (!) 172 kg (379 lb 4.8 oz)   SpO2 96%   BMI 52.16 kg/m     Estimated body mass index is 52.16 kg/m  as calculated from the following:    Height as of this encounter: 1.816 m (5' 11.5\").    Weight as of this encounter: 172 kg (379 lb 4.8 oz).    Physical Exam  GENERAL: alert and no distress  EYES: Eyes grossly normal to inspection, PERRL and conjunctivae and sclerae normal  HENT: ear canals and TM's normal, nose and mouth without ulcers or lesions  NECK: no adenopathy, no asymmetry, masses, or scars  RESP: lungs clear to auscultation - no rales, rhonchi or wheezes  CV: regular rates and rhythm, normal S1 S2, no S3 or S4, no murmur, click or rub, and bilateral lower extremity edema and mottling  ABDOMEN: soft, nontender, no hepatosplenomegaly, no masses and bowel sounds normal  MS: no gross musculoskeletal defects noted, no edema  SKIN: no suspicious lesions  NEURO: Normal strength and tone, mentation intact and speech normal  PSYCH: mentation appears normal, affect normal/bright    A/P:  1. Routine general medical examination at a health care facility (Primary)  - Comprehensive metabolic panel (BMP + Alb, Alk Phos, ALT, AST, Total. Bili, TP); Future    2. Acquired hypothyroidism  Instructed on use of medication, potential side effects and adverse reactions.  Recheck TSH in 6-8 weeks.   - levothyroxine (SYNTHROID/LEVOTHROID) 50 MCG tablet; Take 1 tablet (50 mcg) by mouth daily.  Dispense: 90 tablet; Refill: 0  - **TSH with free T4 reflex FUTURE 2mo; Future    3. Essential (primary) hypertension  Stable  - Comprehensive metabolic panel (BMP + Alb, Alk Phos, ALT, AST, " Total. Bili, TP); Future    4. Elevated glucose  - Comprehensive metabolic panel (BMP + Alb, Alk Phos, ALT, AST, Total. Bili, TP); Future  - Hemoglobin A1c; Future    5. Atrial fibrillation with rapid ventricular response (H)  Current resolved    6. Bilateral leg edema  - Vascular Medicine Referral; Future    7. Lipid screening  - Lipid panel reflex to direct LDL Fasting; Future      Signed Electronically by: Mayra De La Rosa CNP    Answers submitted by the patient for this visit:  Patient Health Questionnaire (Submitted on 10/30/2024)  If you checked off any problems, how difficult have these problems made it for you to do your work, take care of things at home, or get along with other people?: Not difficult at all  PHQ9 TOTAL SCORE: 0  Patient Health Questionnaire (G7) (Submitted on 10/30/2024)  HARSH 7 TOTAL SCORE: 0

## 2024-10-31 ENCOUNTER — TELEPHONE (OUTPATIENT)
Dept: OTHER | Facility: CLINIC | Age: 34
End: 2024-10-31
Payer: COMMERCIAL

## 2024-10-31 NOTE — TELEPHONE ENCOUNTER
Referral received via [employer] on 10/30/2024.    Referred by Mayra De La Rosa CNP for bilateral leg edema.    Previous imaging completed (pertinent to referral):  na    Routing to scheduling to coordinate the following:  NEW VASCULAR PATIENT consult with Vascular Medicine  Please schedule this at next available      Appt note:    Referred by Mayra De La Rosa CNP for bilateral leg edema.

## 2024-11-06 ENCOUNTER — OFFICE VISIT (OUTPATIENT)
Dept: CARDIOLOGY | Facility: CLINIC | Age: 34
End: 2024-11-06
Attending: INTERNAL MEDICINE
Payer: COMMERCIAL

## 2024-11-06 VITALS
DIASTOLIC BLOOD PRESSURE: 75 MMHG | HEART RATE: 78 BPM | SYSTOLIC BLOOD PRESSURE: 107 MMHG | WEIGHT: 315 LBS | HEIGHT: 72 IN | BODY MASS INDEX: 42.66 KG/M2

## 2024-11-06 DIAGNOSIS — I48.91 ATRIAL FIBRILLATION WITH RAPID VENTRICULAR RESPONSE (H): ICD-10-CM

## 2024-11-06 PROCEDURE — 93000 ELECTROCARDIOGRAM COMPLETE: CPT

## 2024-11-06 PROCEDURE — G2211 COMPLEX E/M VISIT ADD ON: HCPCS

## 2024-11-06 PROCEDURE — 99215 OFFICE O/P EST HI 40 MIN: CPT

## 2024-11-06 NOTE — PROGRESS NOTES
~Cardiology Clinic Visit~    Primary Cardiologist: Dr. Munoz  Reason for visit: 3-week follow-up      Eduin Pitts MRN# [id number]   YOB: 1990 Age: 34 year old       HPI:    Eduin Pitts is a delightful 34 year old patient with past medical history significant for:    Atrial fibrillation dating back to 2013  Morbid obesity  Hypothyroidism     I had the opportunity to see Mr. Pitts today for cardiology follow-up.  He was first seen by Dr. Downing on 9/26/2024 for management of his atrial fibrillation.  In review, he has a history of atrial fibrillation dating back to about 2013.  He has had 4-5 episodes of A-fib since that time with most recent episode on 9/25/2024 lasting close to 10 days.  When seen last a transesophageal echocardiogram and cardioversion were recommended.  He was started on metoprolol and Eliquis and subsequently converted to sinus rhythm prior to cardioversion.    Due to conversion to sinus rhythm transesophageal echocardiogram was not done.  TSH normal.  He does not drink alcohol and limits his caffeine intake.    Since seen last, he is now off of his metoprolol and Eliquis. He had marked fatigue with metoprolol. Mr. Pitts has no chest pain, tightness, or discomfort. No shortness of breath or dyspnea on exertion. No orthopnea. Does wake up feeling a choking sensation. Chronic lower extremity swelling with plans to follow up with vascular- no change. He has noticed intermittent palpitations.    12-lead EKG shows sinus rhythm    Blood pressure today 107/75    Diagnotic studies:  EKG 9/25/2024: atrial fibrillation with ventricular rate 101 bpm      Assessment:  Atrial fibrillation dating back to 2013  [id number] Score 0  Currently off anticoagulation  EKG today shows sinus rhythm  Morbid obesity  Hypothyroidism   Recently started on levothyroxine  Likely obstructive sleep apnea      Plan:   It was my pleasure to see Eduin for cardiology follow-up  today.  Overall he is doing well from a cardiovascular standpoint.  He converted out of atrial fibrillation within a few days of starting the metoprolol and Eliquis.  He completed 30 days of metoprolol and Eliquis and is now off of both.  He does note intermittent palpitations.  EKG today shows sinus rhythm.  I have encouraged him to purchase a [employer] mobile heart monitor device to assess for recurrence of atrial fibrillation.  Since the transesophageal echocardiogram was canceled will plan to do an exercise stress echo cardiogram to evaluate the heart structurally and rule out ischemia.  If no ischemia on the stress test consider as needed flecainide for recurrence of atrial fibrillation.  I have asked that if he goes back into atrial fibrillation to try taking a metoprolol and could restart Eliquis if he does not convert and check in with us in the event he should need a cardioversion.  His most recent episode of atrial fibrillation lasted nearly 10 days when previous episodes lasted only 2 to 3 hours.  [id number] score 0.  Likely sleep apnea, will place referral to sleep medicine  PCP with plans for cholesterol check  Follow-up with me in 1 month to review stress test results.    Thank you for the opportunity to participate in this pleasant patient's care.    We would be happy to see this patient sooner for any concerns in the meantime.    LINDA Sherman, CNP   Nurse Practitioner  [facility] [place] [facility]      Today's clinic visit entailed:  A total of 45 minutes was spent with patient, on chart review and documentation today.     The longitudinal plan of care for the diagnosis(es)/condition(s) as documented were addressed during this visit. Due to the added complexity in care, I will continue to support Eduin in the subsequent management and with ongoing continuity of care.      Orders Placed This Encounter   Procedures    Adult Sleep Eval & Management Referral    Follow-Up with Cardiology DONELL     Exercise Stress Echocardiogram     No orders of the defined types were placed in this encounter.    There are no discontinued medications.      Encounter Diagnosis   Name Primary?    Atrial fibrillation with rapid ventricular response (H)        CURRENT MEDICATIONS:  Current Outpatient Medications   Medication Sig Dispense Refill    levothyroxine (SYNTHROID/LEVOTHROID) 50 MCG tablet Take 1 tablet (50 mcg) by mouth daily. 90 tablet 0       ALLERGIES   No Known Allergies    PAST MEDICAL HISTORY:  Past Medical History:   Diagnosis Date    Compensated hypothyroidism     Hyperlipidemia LDL goal <130     Morbid obesity (H)     CURT (obstructive sleep apnea)     mild no treatment    Paroxysmal atrial fibrillation (H)        PAST SURGICAL HISTORY:  Past Surgical History:   Procedure Laterality Date    AS EXCISION OF UVULA      LASER ABLATION VEIN VARICOSE Bilateral     vs heat to close bilateral lower ext varicose vein, no h/o DVT    TONSILLECTOMY      wisdom teeth         FAMILY HISTORY:  Family History   Problem Relation Age of Onset    Diabetes Mother     No Known Problems Father     No Known Problems Brother     Diabetes Paternal Grandmother        SOCIAL HISTORY:  Social History     Socioeconomic History    Marital status: Single     Spouse name: None    Number of children: None    Years of education: None    Highest education level: None   Tobacco Use    Smoking status: Never     Passive exposure: Never    Smokeless tobacco: Never   Vaping Use    Vaping status: Never Used   Substance and Sexual Activity    Alcohol use: Not Currently     Comment: rare    Drug use: No     Comment: caffeine  none     Social Drivers of Health     Financial Resource Strain: Low Risk  (10/30/2024)    Financial Resource Strain     Within the past 12 months, have you or your family members you live with been unable to get utilities (heat, electricity) when it was really needed?: No   Food Insecurity: Low Risk  (10/30/2024)    Food Insecurity      Within the past 12 months, did you worry that your food would run out before you got money to buy more?: No     Within the past 12 months, did the food you bought just not last and you didn t have money to get more?: No   Transportation Needs: Low Risk  (10/30/2024)    Transportation Needs     Within the past 12 months, has lack of transportation kept you from medical appointments, getting your medicines, non-medical meetings or appointments, work, or from getting things that you need?: No   Physical Activity: Insufficiently Active (10/30/2024)    Exercise Vital Sign     Days of Exercise per Week: 2 days     Minutes of Exercise per Session: 20 min   Stress: No Stress Concern Present (10/30/2024)    [employer] [place] [employer] - Occupational Stress Questionnaire     Feeling of Stress : Not at all   Social Connections: Unknown (10/30/2024)    Social Connection and Isolation Panel [NHANES]     Frequency of Social Gatherings with Friends and Family: Once a week   Interpersonal Safety: Low Risk  (10/30/2024)    Interpersonal Safety     Do you feel physically and emotionally safe where you currently live?: Yes     Within the past 12 months, have you been hit, slapped, kicked or otherwise physically hurt by someone?: Patient unable to answer     Within the past 12 months, have you been humiliated or emotionally abused in other ways by your partner or ex-partner?: Patient unable to answer   Housing Stability: Low Risk  (10/30/2024)    Housing Stability     Do you have housing? : Yes     Are you worried about losing your housing?: No       Review of Systems:  Skin:        Eyes:       ENT:       Respiratory:  Negative    Cardiovascular:    Positive for;palpitations;edema  Gastroenterology:      Genitourinary:       Musculoskeletal:       Neurologic:       Psychiatric:       Heme/Lymph/Imm:  Negative    Endocrine:  Positive for thyroid disorder    Physical Exam:    Vitals: /75   Pulse 78   Ht 1.816 m  "(5' 11.5\")   Wt (!) 172.3 kg (379 lb 14.4 oz)   BMI 52.25 kg/m    Constitutional: Well nourished and in no apparent distress.  Eyes: Pupils equal, round. Sclerae anicteric.   HEENT: Normocephalic, atraumatic.   Neck: Supple.   Respiratory: Breathing non-labored. Lungs clear to auscultation bilaterally. No crackles, wheezes, rhonchi, or rales.  Cardiovascular:  Regular rate and rhythm, normal S1 and S2. No murmur, rub, or gallop.  Skin: Warm, dry.   Extremities: Bilateral lower extremity edema.  Neurologic: No gross motor deficits. Alert, awake, and oriented to person, place and time.  Psychiatric: Affect appropriate.        Recent Lab Results:  LIPID RESULTS:  No results found for: \"CHOL\", \"HDL\", \"LDL\", \"TRIG\", \"CHOLHDLRATIO\"    LIVER ENZYME RESULTS:  Lab Results   Component Value Date    AST 26 04/20/2016    ALT 40 04/20/2016       CBC RESULTS:  Lab Results   Component Value Date    WBC 9.8 09/25/2024    WBC 10.0 04/20/2016    RBC 5.33 09/25/2024    RBC 5.17 04/20/2016    HGB 14.9 09/25/2024    HGB 15.7 04/20/2016    HCT 45.1 09/25/2024    HCT 45.5 04/20/2016    MCV 85 09/25/2024    MCV 88 04/20/2016    MCH 28.0 09/25/2024    MCH 30.4 04/20/2016    MCHC 33.0 09/25/2024    MCHC 34.5 04/20/2016    RDW 12.6 09/25/2024    RDW 11.6 04/20/2016     09/25/2024     04/20/2016       BMP RESULTS:  Lab Results   Component Value Date     09/25/2024     04/20/2016    POTASSIUM 4.2 09/25/2024    POTASSIUM 3.8 04/20/2016    CHLORIDE 107 09/25/2024    CHLORIDE 107 04/20/2016    CO2 18 (L) 09/25/2024    CO2 31 04/20/2016    ANIONGAP 13 09/25/2024    ANIONGAP 2 (L) 04/20/2016     (H) 09/25/2024    GLC 91 04/20/2016    BUN 19.0 09/25/2024    BUN 17 04/20/2016    CR 1.08 09/25/2024    CR 0.84 04/20/2016    GFRESTIMATED >90 09/25/2024    GFRESTIMATED >90  Non  GFR Calc   04/20/2016    GFRESTBLACK >90   GFR Calc   04/20/2016    KAYLEIGH 9.0 09/25/2024    KAYLEIGH 8.8 04/20/2016    " "    A1C RESULTS:  No results found for: \"A1C\"    INR RESULTS:  Lab Results   Component Value Date    INR 1.23 (H) 04/20/2016    INR 1.20 08/24/2013           CC  Tim Munoz MD  [address]  [place] [id number]                "

## 2024-11-06 NOTE — LETTER
11/6/2024    Physician No Ref-Primary  No address on file    RE: Eduin Pitts       Dear Colleague,     I had the pleasure of seeing Eduin Pitts in the [facility].          ~Cardiology Clinic Visit~    Primary Cardiologist: Dr. Munoz  Reason for visit: 3-week follow-up      Eduin Pitts MRN# [id number]   YOB: 1990 Age: 34 year old       HPI:    Eduin Pitts is a delightful 34 year old patient with past medical history significant for:    Atrial fibrillation dating back to 2013  Morbid obesity  Hypothyroidism     I had the opportunity to see Mr. Pitts today for cardiology follow-up.  He was first seen by Dr. Downing on 9/26/2024 for management of his atrial fibrillation.  In review, he has a history of atrial fibrillation dating back to about 2013.  He has had 4-5 episodes of A-fib since that time with most recent episode on 9/25/2024 lasting close to 10 days.  When seen last a transesophageal echocardiogram and cardioversion were recommended.  He was started on metoprolol and Eliquis and subsequently converted to sinus rhythm prior to cardioversion.    Due to conversion to sinus rhythm transesophageal echocardiogram was not done.  TSH normal.  He does not drink alcohol and limits his caffeine intake.    Since seen last, he is now off of his metoprolol and Eliquis. He had marked fatigue with metoprolol. Mr. Pitts has no chest pain, tightness, or discomfort. No shortness of breath or dyspnea on exertion. No orthopnea. Does wake up feeling a choking sensation. Chronic lower extremity swelling with plans to follow up with vascular- no change. He has noticed intermittent palpitations.    12-lead EKG shows sinus rhythm    Blood pressure today 107/75    Diagnotic studies:  EKG 9/25/2024: atrial fibrillation with ventricular rate 101 bpm      Assessment:  Atrial fibrillation dating back to 2013  [id number] Score 0  Currently off anticoagulation  EKG  today shows sinus rhythm  Morbid obesity  Hypothyroidism   Recently started on levothyroxine  Likely obstructive sleep apnea      Plan:   It was my pleasure to see Eduin for cardiology follow-up today.  Overall he is doing well from a cardiovascular standpoint.  He converted out of atrial fibrillation within a few days of starting the metoprolol and Eliquis.  He completed 30 days of metoprolol and Eliquis and is now off of both.  He does note intermittent palpitations.  EKG today shows sinus rhythm.  I have encouraged him to purchase a [employer] mobile heart monitor device to assess for recurrence of atrial fibrillation.  Since the transesophageal echocardiogram was canceled will plan to do an exercise stress echo cardiogram to evaluate the heart structurally and rule out ischemia.  If no ischemia on the stress test consider as needed flecainide for recurrence of atrial fibrillation.  I have asked that if he goes back into atrial fibrillation to try taking a metoprolol and could restart Eliquis if he does not convert and check in with us in the event he should need a cardioversion.  His most recent episode of atrial fibrillation lasted nearly 10 days when previous episodes lasted only 2 to 3 hours.  [id number] score 0.  Likely sleep apnea, will place referral to sleep medicine  PCP with plans for cholesterol check  Follow-up with me in 1 month to review stress test results.    Thank you for the opportunity to participate in this pleasant patient's care.    We would be happy to see this patient sooner for any concerns in the meantime.    LINDA Sherman, CNP   Nurse Practitioner  [facility]      Today's clinic visit entailed:  A total of 45 minutes was spent with patient, on chart review and documentation today.     The longitudinal plan of care for the diagnosis(es)/condition(s) as documented were addressed during this visit. Due to the added complexity in care, I will continue to support  Eduin in the subsequent management and with ongoing continuity of care.      Orders Placed This Encounter   Procedures     Adult Sleep Eval & Management Referral     Follow-Up with Cardiology DONELL     Exercise Stress Echocardiogram     No orders of the defined types were placed in this encounter.    There are no discontinued medications.      Encounter Diagnosis   Name Primary?     Atrial fibrillation with rapid ventricular response (H)        CURRENT MEDICATIONS:  Current Outpatient Medications   Medication Sig Dispense Refill     levothyroxine (SYNTHROID/LEVOTHROID) 50 MCG tablet Take 1 tablet (50 mcg) by mouth daily. 90 tablet 0       ALLERGIES   No Known Allergies    PAST MEDICAL HISTORY:  Past Medical History:   Diagnosis Date     Compensated hypothyroidism      Hyperlipidemia LDL goal <130      Morbid obesity (H)      CURT (obstructive sleep apnea)     mild no treatment     Paroxysmal atrial fibrillation (H)        PAST SURGICAL HISTORY:  Past Surgical History:   Procedure Laterality Date     AS EXCISION OF UVULA       LASER ABLATION VEIN VARICOSE Bilateral     vs heat to close bilateral lower ext varicose vein, no h/o DVT     TONSILLECTOMY       wisdom teeth         FAMILY HISTORY:  Family History   Problem Relation Age of Onset     Diabetes Mother      No Known Problems Father      No Known Problems Brother      Diabetes Paternal Grandmother        SOCIAL HISTORY:  Social History     Socioeconomic History     Marital status: Single     Spouse name: None     Number of children: None     Years of education: None     Highest education level: None   Tobacco Use     Smoking status: Never     Passive exposure: Never     Smokeless tobacco: Never   Vaping Use     Vaping status: Never Used   Substance and Sexual Activity     Alcohol use: Not Currently     Comment: rare     Drug use: No     Comment: caffeine  none     Social Drivers of Health     Financial Resource Strain: Low Risk  (10/30/2024)    Financial Resource  Strain      Within the past 12 months, have you or your family members you live with been unable to get utilities (heat, electricity) when it was really needed?: No   Food Insecurity: Low Risk  (10/30/2024)    Food Insecurity      Within the past 12 months, did you worry that your food would run out before you got money to buy more?: No      Within the past 12 months, did the food you bought just not last and you didn t have money to get more?: No   Transportation Needs: Low Risk  (10/30/2024)    Transportation Needs      Within the past 12 months, has lack of transportation kept you from medical appointments, getting your medicines, non-medical meetings or appointments, work, or from getting things that you need?: No   Physical Activity: Insufficiently Active (10/30/2024)    Exercise Vital Sign      Days of Exercise per Week: 2 days      Minutes of Exercise per Session: 20 min   Stress: No Stress Concern Present (10/30/2024)    [employer] [place] [employer] - Occupational Stress Questionnaire      Feeling of Stress : Not at all   Social Connections: Unknown (10/30/2024)    Social Connection and Isolation Panel [NHANES]      Frequency of Social Gatherings with Friends and Family: Once a week   Interpersonal Safety: Low Risk  (10/30/2024)    Interpersonal Safety      Do you feel physically and emotionally safe where you currently live?: Yes      Within the past 12 months, have you been hit, slapped, kicked or otherwise physically hurt by someone?: Patient unable to answer      Within the past 12 months, have you been humiliated or emotionally abused in other ways by your partner or ex-partner?: Patient unable to answer   Housing Stability: Low Risk  (10/30/2024)    Housing Stability      Do you have housing? : Yes      Are you worried about losing your housing?: No       Review of Systems:  Skin:        Eyes:       ENT:       Respiratory:  Negative    Cardiovascular:    Positive  "for;palpitations;edema  Gastroenterology:      Genitourinary:       Musculoskeletal:       Neurologic:       Psychiatric:       Heme/Lymph/Imm:  Negative    Endocrine:  Positive for thyroid disorder    Physical Exam:    Vitals: /75   Pulse 78   Ht 1.816 m (5' 11.5\")   Wt (!) 172.3 kg (379 lb 14.4 oz)   BMI 52.25 kg/m    Constitutional: Well nourished and in no apparent distress.  Eyes: Pupils equal, round. Sclerae anicteric.   HEENT: Normocephalic, atraumatic.   Neck: Supple.   Respiratory: Breathing non-labored. Lungs clear to auscultation bilaterally. No crackles, wheezes, rhonchi, or rales.  Cardiovascular:  Regular rate and rhythm, normal S1 and S2. No murmur, rub, or gallop.  Skin: Warm, dry.   Extremities: Bilateral lower extremity edema.  Neurologic: No gross motor deficits. Alert, awake, and oriented to person, place and time.  Psychiatric: Affect appropriate.        Recent Lab Results:  LIPID RESULTS:  No results found for: \"CHOL\", \"HDL\", \"LDL\", \"TRIG\", \"CHOLHDLRATIO\"    LIVER ENZYME RESULTS:  Lab Results   Component Value Date    AST 26 04/20/2016    ALT 40 04/20/2016       CBC RESULTS:  Lab Results   Component Value Date    WBC 9.8 09/25/2024    WBC 10.0 04/20/2016    RBC 5.33 09/25/2024    RBC 5.17 04/20/2016    HGB 14.9 09/25/2024    HGB 15.7 04/20/2016    HCT 45.1 09/25/2024    HCT 45.5 04/20/2016    MCV 85 09/25/2024    MCV 88 04/20/2016    MCH 28.0 09/25/2024    MCH 30.4 04/20/2016    MCHC 33.0 09/25/2024    MCHC 34.5 04/20/2016    RDW 12.6 09/25/2024    RDW 11.6 04/20/2016     09/25/2024     04/20/2016       BMP RESULTS:  Lab Results   Component Value Date     09/25/2024     04/20/2016    POTASSIUM 4.2 09/25/2024    POTASSIUM 3.8 04/20/2016    CHLORIDE 107 09/25/2024    CHLORIDE 107 04/20/2016    CO2 18 (L) 09/25/2024    CO2 31 04/20/2016    ANIONGAP 13 09/25/2024    ANIONGAP 2 (L) 04/20/2016     (H) 09/25/2024    GLC 91 04/20/2016    BUN 19.0 09/25/2024    " "BUN 17 04/20/2016    CR 1.08 09/25/2024    CR 0.84 04/20/2016    GFRESTIMATED >90 09/25/2024    GFRESTIMATED >90  Non  GFR Calc   04/20/2016    GFRESTBLACK >90   GFR Calc   04/20/2016    KAYLEIGH 9.0 09/25/2024    KAYLEIGH 8.8 04/20/2016        A1C RESULTS:  No results found for: \"A1C\"    INR RESULTS:  Lab Results   Component Value Date    INR 1.23 (H) 04/20/2016    INR 1.20 08/24/2013           CC  Tim Munoz MD  [address]  [place] [id number]                  Thank you for allowing me to participate in the care of your patient.      Sincerely,     LINDA Sherman [facility]  cc:   Tim Munoz MD  [address]  [place] [phone]      "

## 2024-11-06 NOTE — TELEPHONE ENCOUNTER
Left voicemail for patient to schedule appointment(s), stated this our 2nd attempt at scheduling and provided [facility] telephone number for patient to call back to schedule.    NEW VASCULAR PATIENT consult with Vascular Medicine  Please schedule this at next available      Appt note:    Referred by Mayra De La Rosa CNP for bilateral leg edema.

## 2024-11-06 NOTE — PATIENT INSTRUCTIONS
Thank you for your visit with the [facility] today.    Today's Summary:    I've place a referral to sleep medicine for evaluation of sleep apnea  I've placed an order for a exercise stress echocardiogram  Look into purchasing a [employer] mobile device to monitor for a fib (palpitations)  If you notice that you're back in atrial fibrillation you can take a dose of metoprolol and if you stay in the a fib for longer than 1 hour- restart the Eliquis and let us know (in case you don't convert and need a cardioversion to get you out).    Follow-up:  Cardiology follow up at [facility]: 1 month after stress test.     Cardiology Scheduling ~[phone]  Cardiology Clinic RN ~ [phone]    It was a pleasure seeing you today.     LINDA Sherman, CNP  Certified Nurse Practitioner  [facility]  November 6, 2024  ________________________________________________________

## 2024-11-12 ENCOUNTER — TELEPHONE (OUTPATIENT)
Dept: CARDIOLOGY | Facility: CLINIC | Age: 34
End: 2024-11-12
Payer: COMMERCIAL

## 2024-11-12 NOTE — TELEPHONE ENCOUNTER
[facility] Call Center    Phone Message    May a detailed message be left on voicemail: yes     Reason for Call: Other: Yayo called in regards to PA for Echo. They left a number for the care team to [phone]. Reference number [id number]     Action Taken: Other: [facility]    Travel Screening: Not Applicable     Date of Service:

## 2024-11-12 NOTE — TELEPHONE ENCOUNTER
Peer to peer needed with ordering provider. Will have Anni in administration help set up. Will route to Annetta as FYI.

## 2024-11-13 ENCOUNTER — TELEPHONE (OUTPATIENT)
Dept: CARDIOLOGY | Facility: CLINIC | Age: 34
End: 2024-11-13

## 2024-11-13 NOTE — TELEPHONE ENCOUNTER
Peer to peer review of stress echocardiogram with patient's insurance carrier. I spoke with Dr. Layton who provided the following authorization code: [id number] which is good through 3/5/2025.    LINDA Sherman CNP

## 2024-11-13 NOTE — TELEPHONE ENCOUNTER
Stress echo approved:   Annetta Sahu, LINDA CNP  You1 minute ago (3:41 PM)     AM  [employer]- I think I routed my note to you but it was authorized. Here is the code: [id number] good through 3/5/2024. Thank you, Annetta

## 2024-11-18 ENCOUNTER — HOSPITAL ENCOUNTER (OUTPATIENT)
Dept: CARDIOLOGY | Facility: CLINIC | Age: 34
Discharge: HOME | End: 2024-11-18
Payer: COMMERCIAL

## 2024-11-18 DIAGNOSIS — I48.91 ATRIAL FIBRILLATION WITH RAPID VENTRICULAR RESPONSE (H): ICD-10-CM

## 2024-11-18 PROCEDURE — 255N000002 HC RX 255 OP 636

## 2024-11-18 PROCEDURE — C8928 TTE W OR W/O FOL W/CON,STRES: HCPCS

## 2024-11-18 PROCEDURE — 999N000208 ECHO STRESS ECHOCARDIOGRAM

## 2024-11-18 RX ADMIN — HUMAN ALBUMIN MICROSPHERES AND PERFLUTREN 9 ML: 10; .22 INJECTION, SOLUTION INTRAVENOUS at 11:16

## 2024-12-31 ENCOUNTER — VIRTUAL VISIT (OUTPATIENT)
Dept: FAMILY MEDICINE | Facility: CLINIC | Age: 34
End: 2024-12-31
Payer: COMMERCIAL

## 2024-12-31 DIAGNOSIS — Z71.2 ENCOUNTER TO DISCUSS TEST RESULTS: Primary | ICD-10-CM

## 2024-12-31 DIAGNOSIS — E03.9 ACQUIRED HYPOTHYROIDISM: ICD-10-CM

## 2024-12-31 DIAGNOSIS — B36.9 FUNGAL INFECTION OF SKIN OF ABDOMEN: ICD-10-CM

## 2024-12-31 PROCEDURE — 99213 OFFICE O/P EST LOW 20 MIN: CPT | Mod: 95

## 2024-12-31 PROCEDURE — G2211 COMPLEX E/M VISIT ADD ON: HCPCS | Mod: 95

## 2024-12-31 RX ORDER — NYSTATIN 100000 U/G
CREAM TOPICAL 2 TIMES DAILY
Qty: 30 G | Refills: 3 | Status: SHIPPED | OUTPATIENT
Start: 2024-12-31

## 2024-12-31 NOTE — PROGRESS NOTES
"Eduin is a 34 year old who is being evaluated via a billable video visit.    How would you like to obtain your AVS? MyChart  If the video visit is dropped, the invitation should be resent by: Text to cell phone: [phone]  Will anyone else be joining your video visit? No      Assessment & Plan     Encounter to discuss test results  Had recent blood work done, would like to discuss results.  Went through each test and noted any abnormal or out of range values including creatinine and HDL cholesterol.  PCP ordered recheck of creatinine during next lab draw.  Discussed that this does not always indicate accurate kidney function but could be slightly altered due to dehydration or illness, encouraged adequate hydration prior to next test.  Discussed increasing aerobic activity as contributing factor and increasing HDL cholesterol levels.    Acquired hypothyroidism  Reports being diagnosed last year and was started on levothyroxine, however ultimately stopped taking it until he was restarted in September.  TSH improved from September, dose was increased to 75 mcg so he will start that when he picks up medication with plan to recheck in 6 to 8 weeks which is already ordered.    Fungal infection of skin of abdomen  Has intermittent fungal rash in abdominal/groin folds, was given a cream while in [place] and would like to refill antifungal cream to help treat symptoms as needed.  Will advise if symptoms do not improve  - nystatin (MYCOSTATIN) 276639 UNIT/GM external cream; Apply topically 2 times daily.          BMI  Estimated body mass index is 52.25 kg/m  as calculated from the following:    Height as of 11/6/24: 1.816 m (5' 11.5\").    Weight as of 11/6/24: 172.3 kg (379 lb 14.4 oz).         FUTURE LABS:       - Schedule non-fasting labs in 2 months  FUTURE APPOINTMENTS:       - Follow-up for annual visit or as needed  Work on weight loss  Regular exercise    Subjective   Eduin is a 34 year old, presenting for the " following health issues:  Results    Wanting to discuss recent lab results  Has a prescription from [facility] that he has been using on abdominal folds (Betamethasone, clomitrazole, and gentamicin), would like refill if possible    History of Present Illness       Hypothyroidism:     Since last visit, patient describes the following symptoms::  None    He eats 2-3 servings of fruits and vegetables daily.He consumes 1 sweetened beverage(s) daily.He exercises with enough effort to increase his heart rate 10 to 19 minutes per day.  He exercises with enough effort to increase his heart rate 3 or less days per week. He is missing 2 dose(s) of medications per week.  He is not taking prescribed medications regularly due to remembering to take.               Review of Systems  Constitutional, HEENT, cardiovascular, pulmonary, gi and gu systems are negative, except as otherwise noted.      Objective    Vitals - Patient Reported  Pain Score: No Pain (0)      Vitals:  No vitals were obtained today due to virtual visit.    Physical Exam   GENERAL: alert and no distress  EYES: Eyes grossly normal to inspection.  No discharge or erythema, or obvious scleral/conjunctival abnormalities.  RESP: No audible wheeze, cough, or visible cyanosis.    SKIN: Visible skin clear. No significant rash, abnormal pigmentation or lesions.  NEURO: Cranial nerves grossly intact.  Mentation and speech appropriate for age.  PSYCH: Appropriate affect, tone, and pace of words          Video-Visit Details    Type of service:  Video Visit   Originating Location (pt. Location): Home  Distant Location (provider location):  Off-site  Platform used for Video Visit: Nicolette  Signed Electronically by: Karina Lynch, HENNY, APRN, CNP

## 2025-01-15 ENCOUNTER — VIRTUAL VISIT (OUTPATIENT)
Dept: SLEEP MEDICINE | Facility: CLINIC | Age: 35
End: 2025-01-15
Payer: COMMERCIAL

## 2025-01-15 VITALS — WEIGHT: 315 LBS | BODY MASS INDEX: 42.66 KG/M2 | HEIGHT: 72 IN

## 2025-01-15 DIAGNOSIS — E66.813 CLASS 3 SEVERE OBESITY IN ADULT, UNSPECIFIED BMI, UNSPECIFIED OBESITY TYPE, UNSPECIFIED WHETHER SERIOUS COMORBIDITY PRESENT (H): ICD-10-CM

## 2025-01-15 DIAGNOSIS — I48.91 ATRIAL FIBRILLATION WITH RAPID VENTRICULAR RESPONSE (H): ICD-10-CM

## 2025-01-15 DIAGNOSIS — Z86.69 HISTORY OF SLEEP APNEA: ICD-10-CM

## 2025-01-15 DIAGNOSIS — E66.01 CLASS 3 SEVERE OBESITY IN ADULT, UNSPECIFIED BMI, UNSPECIFIED OBESITY TYPE, UNSPECIFIED WHETHER SERIOUS COMORBIDITY PRESENT (H): ICD-10-CM

## 2025-01-15 DIAGNOSIS — R06.83 SNORING: Primary | ICD-10-CM

## 2025-01-15 DIAGNOSIS — G47.30 OBSERVED SLEEP APNEA: ICD-10-CM

## 2025-01-15 PROCEDURE — 98001 SYNCH AUDIO-VIDEO NEW LOW 30: CPT | Performed by: INTERNAL MEDICINE

## 2025-01-15 ASSESSMENT — SLEEP AND FATIGUE QUESTIONNAIRES
HOW LIKELY ARE YOU TO NOD OFF OR FALL ASLEEP WHILE WATCHING TV: SLIGHT CHANCE OF DOZING
HOW LIKELY ARE YOU TO NOD OFF OR FALL ASLEEP WHILE SITTING INACTIVE IN A PUBLIC PLACE: WOULD NEVER DOZE
HOW LIKELY ARE YOU TO NOD OFF OR FALL ASLEEP IN A CAR, WHILE STOPPED FOR A FEW MINUTES IN TRAFFIC: WOULD NEVER DOZE
HOW LIKELY ARE YOU TO NOD OFF OR FALL ASLEEP WHILE SITTING AND READING: SLIGHT CHANCE OF DOZING
HOW LIKELY ARE YOU TO NOD OFF OR FALL ASLEEP WHEN YOU ARE A PASSENGER IN A CAR FOR AN HOUR WITHOUT A BREAK: SLIGHT CHANCE OF DOZING
HOW LIKELY ARE YOU TO NOD OFF OR FALL ASLEEP WHILE SITTING AND TALKING TO SOMEONE: WOULD NEVER DOZE
HOW LIKELY ARE YOU TO NOD OFF OR FALL ASLEEP WHILE SITTING QUIETLY AFTER LUNCH WITHOUT ALCOHOL: WOULD NEVER DOZE
HOW LIKELY ARE YOU TO NOD OFF OR FALL ASLEEP WHILE LYING DOWN TO REST IN THE AFTERNOON WHEN CIRCUMSTANCES PERMIT: SLIGHT CHANCE OF DOZING

## 2025-01-15 ASSESSMENT — PAIN SCALES - GENERAL: PAINLEVEL_OUTOF10: NO PAIN (0)

## 2025-01-15 NOTE — PROGRESS NOTES
Virtual Visit Details    Type of service:  Video Visit     Originating Location (pt. Location): Home    Distant Location (provider location):  Off-site  Platform used for Video Visit: Nicolette    Chief complaint:Consultation requested by LINDA Pryor CNP for evaluation of snoring, waking up choking, sleep apnea    Comprehensive sleep medicine questionnaire reviewed    History of Present Illness: 34-year-old gentleman with history of obesity, paroxysmal atrial fibrillation and recent diagnosis of hypothyroidism.  Started having atrial fibrillation back in 2013.  He recalls being evaluated for sleep apnea and being found to have mild sleep apnea by in-lab sleep study in the [facility] [place] ([place]).  He was told that it was mild and was recommended to have surgery.  He had tonsillectomy and uvulectomy.  No repeat testing that he recalls.  He thinks he has gained weight since that time.  He is continue to have occasional paroxysms of atrial fibrillation most recently a few months ago.  He has been snoring loudly and having observed apneas by his wife.  He also has woken himself up with a choking sensation.  He often have dry mouth and sore throat in the morning.  He denies morning headaches.  Sleep quality is somewhat poor.  He feels like he wakes up frequently at night and needs to change body positions.  He will get up at least once usually twice to go to the bathroom.  He denies difficulty returning to sleep.  He is not taking any sleep aids.  He is also avoiding caffeine during the day.  He does feel tired and irritable due to poor quality sleep.    Typical bedtimes around 11 PM with rise time around 7 30-8 AM.  He is not taking naps during the day nor does he find himself excessively drowsy.    He has a history of some sleep talking but no sleepwalking, dream enactment behavior or clear restless legs.    [place] Sleepiness Scale   Sitting and reading: (Patient-Rptd) Slight chance of dozing    Watching TV: (Patient-Rptd) Slight chance of dozing   Sitting, inactive in a public place (e.g. a theatre or a meeting): (Patient-Rptd) Would never doze   As a passenger in a car for an hour without a break: (Patient-Rptd) Slight chance of dozing   Lying down to rest in the afternoon when circumstances permit: (Patient-Rptd) Slight chance of dozing   Sitting and talking to someone: (Patient-Rptd) Would never doze   Sitting quietly after a lunch without alcohol: (Patient-Rptd) Would never doze   In a car, while stopped for a few minutes in traffic: (Patient-Rptd) Would never doze   Total score - Stout: (Patient-Rptd) 4  (Less than 10 normal)    Insomnia Severity Scale  CADEN Total Score: (Patient-Rptd) 10  Total score categories:  0-7 = No clinically significant insomnia   8-14 = Subthreshold insomnia   15-21 = Clinical insomnia (moderate severity)  22-28 = Clinical insomnia (severe)    STOP-BANG  Loud Snore   1  Excessively Tired/Sleepy   1  Observed apnea   1  Hypertension   0  BMI> 35 kg/m2   1  Age >50   0  Neck >16 in/40cm   ?  Male Gender   1  Total =   5  (0-2 low, 3-4 intermediate, 5-8 high risk of CURT)      Past Medical History:   Diagnosis Date    Compensated hypothyroidism     Hyperlipidemia LDL goal <130     Morbid obesity (H)     CURT (obstructive sleep apnea)     mild no treatment    Paroxysmal atrial fibrillation (H)        No Known Allergies    Current Outpatient Medications   Medication Sig Dispense Refill    levothyroxine (SYNTHROID/LEVOTHROID) 75 MCG tablet Take 1 tablet (75 mcg) by mouth every morning (before breakfast). 90 tablet 0    nystatin (MYCOSTATIN) 415801 UNIT/GM external cream Apply topically 2 times daily. 30 g 3     No current facility-administered medications for this visit.       Social History     Socioeconomic History    Marital status:      Spouse name: Not on file    Number of children: Not on file    Years of education: Not on file    Highest education level: Not on file    Occupational History    Not on file   Tobacco Use    Smoking status: Never     Passive exposure: Never    Smokeless tobacco: Never   Vaping Use    Vaping status: Never Used   Substance and Sexual Activity    Alcohol use: Not Currently     Comment: rare    Drug use: No     Comment: caffeine  none    Sexual activity: Not on file   Other Topics Concern    Not on file   Social History Narrative    Not on file     Social Drivers of Health     Financial Resource Strain: Low Risk  (10/30/2024)    Financial Resource Strain     Within the past 12 months, have you or your family members you live with been unable to get utilities (heat, electricity) when it was really needed?: No   Food Insecurity: Low Risk  (10/30/2024)    Food Insecurity     Within the past 12 months, did you worry that your food would run out before you got money to buy more?: No     Within the past 12 months, did the food you bought just not last and you didn t have money to get more?: No   Transportation Needs: Low Risk  (10/30/2024)    Transportation Needs     Within the past 12 months, has lack of transportation kept you from medical appointments, getting your medicines, non-medical meetings or appointments, work, or from getting things that you need?: No   Physical Activity: Insufficiently Active (10/30/2024)    Exercise Vital Sign     Days of Exercise per Week: 2 days     Minutes of Exercise per Session: 20 min   Stress: No Stress Concern Present (10/30/2024)    [employer] [place] [place] [employer] - Occupational Stress Questionnaire     Feeling of Stress : Not at all   Social Connections: Unknown (10/30/2024)    Social Connection and Isolation Panel [NHANES]     Frequency of Communication with Friends and Family: Not on file     Frequency of Social Gatherings with Friends and Family: Once a week     Attends [employer] Services: Not on file     Active Member of Clubs or Organizations: Not on file     Attends Club or Organization Meetings: Not on  "file     Marital Status: Not on file   Interpersonal Safety: Low Risk  (10/30/2024)    Interpersonal Safety     Do you feel physically and emotionally safe where you currently live?: Yes     Within the past 12 months, have you been hit, slapped, kicked or otherwise physically hurt by someone?: Patient unable to answer     Within the past 12 months, have you been humiliated or emotionally abused in other ways by your partner or ex-partner?: Patient unable to answer   Housing Stability: Low Risk  (10/30/2024)    Housing Stability     Do you have housing? : Yes     Are you worried about losing your housing?: No       Family History   Problem Relation Age of Onset    Diabetes Mother     No Known Problems Father     No Known Problems Brother     Diabetes Paternal Grandmother          EXAM:  Ht 1.829 m (6')   Wt (!) 167.8 kg (370 lb)   BMI 50.18 kg/m    GENERAL: Alert and no distress  EYES: Eyes grossly normal to inspection.  No discharge or erythema, or obvious scleral/conjunctival abnormalities.  RESP: No audible wheeze, cough, or visible cyanosis.    SKIN: Visible skin clear. No significant rash, abnormal pigmentation or lesions.  NEURO: Cranial nerves grossly intact.  Mentation and speech appropriate for age.  PSYCH: Appropriate affect, tone, and pace of words       PSG 2013? [place] (Requested)  Mild CURT        TSH   Date Value Ref [place] Status   12/27/2024 15.30 (H) 0.30 - 4.20 uIU/mL Final   08/24/2013 2.09 0.34 - 4.82 mU/L Final     No results found for: \"MALCOLM\"  Lab Results   Component Value Date    A1C 5.2 12/27/2024           ASSESSMENT:  34-year-old gentleman with history of paroxysmal atrial fibrillation, obesity, snoring, observed apnea, waking up gasping.  History of mild sleep apnea with treatment surgically about 12 years ago.  Recent identification of hypothyroidism with improving TSH.  Identifying and treating obstructive sleep apnea if present is medically indicated.    PLAN:  Reviewed the " pathophysiology of obstructive sleep apnea, indications for treatment, testing options and treatment options.  Recommended home sleep apnea testing.  In the meantime, continue efforts at weight loss, side sleeping, and ensuring adequate sleep time.  I will be contacting patient with results of the home sleep apnea test via [employer] as soon as they become available.  Patient is open to a trial of CPAP I will go ahead and put in orders to try to expedite things if indicated.  He did then be enrolled in the sleep therapy management program.  See instructions for further details of counseling provided.      38 minutes spent by me on the date of the encounter doing chart review, history and exam, documentation and further activities per the note    Emma Robbins M.D.  Pulmonary/Critical Care/Sleep Medicine    [facility]   Floor 1, [address]   [address] [place], [place] [address]. [place] [address]   Appointments: [phone]    The above note was dictated using voice recognition software and may include typographical errors. Please contact the author for any clarifications.

## 2025-01-15 NOTE — PATIENT INSTRUCTIONS
"          MY TREATMENT INFORMATION FOR SLEEP APNEA-  Eduin Pitts    DOCTOR : Emma Robbins MD    Am I having a home sleep study?  --->Watch the video for the device you are using:    -/drop off device-   https://www.[employer].com/watch?v=yGGFBdELGhk      Frequently asked questions:  1. What is Obstructive Sleep Apnea (CURT)? CURT is the most common type of sleep apnea. Apnea means, \"without breath.\"  Apnea is most often caused by narrowing or collapse of the upper airway as muscles relax during sleep.   Almost everyone has occasional apneas. Most people with sleep apnea have had brief interruptions at night frequently for many years.  The severity of sleep apnea is related to how frequent and severe the events are.   2. What are the consequences of CURT? Symptoms include: feeling sleepy during the day, snoring loudly, gasping or stopping of breathing, trouble sleeping, and occasionally morning headaches or heartburn at night.  Sleepiness can be serious and even increase the risk of falling asleep while driving. Other health consequences may include development of high blood pressure and other cardiovascular disease in persons who are susceptible. Untreated CURT  can contribute to heart disease, stroke and diabetes.   3. What are the treatment options? In most situations, sleep apnea is a lifelong disease that must be managed with daily therapy. Medications are not effective for sleep apnea and surgery is generally not considered until other therapies have been tried. Your treatment is your choice . Continuous Positive Airway (CPAP) works right away and is the therapy that is effective in nearly everyone. An oral device to hold your jaw forward is usually the next most reliable option. Other options include postioning devices (to keep you off your back), weight loss, and surgery including a tongue pacing device. There is more detail about some of these options below.  4. Are my sleep studies covered " by insurance? Although we will request verification of coverage, we advise you also check in advance of the study to ensure there is coverage.    Important tips for those choosing CPAP and similar devices  REMEMBER-IF YOU RECEIVE A CALL FROM  [phone]-->IT IS TO SETUP A DEVICE  For new devices, sign up for device MICHAELLE to monitor your device for your followup visits  We encourage you to utilize the [employer] michaelle or website ( https://[employer]/ ) to monitor your therapy progress and share the data with your healthcare team when you discuss your sleep apnea.                                                    Know your equipment:  CPAP is continuous positive airway pressure that prevents obstructive sleep apnea by keeping the throat from collapsing while you are sleeping. In most cases, the device is  smart  and can slowly self-adjusts if your throat collapses and keeps a record every day of how well you are treated-this information is available to you and your care team.  BPAP is bilevel positive airway pressure that keeps your throat open and also assists each breath with a pressure boost to maintain adequate breathing.  Special kinds of BPAP are used in patients who have inadequate breathing from lung or heart disease. In most cases, the device is  smart  and can slowly self-adjusts to assist breathing. Like CPAP, the device keeps a record of how well you are treated.  Your mask is your connection to the device. You get to choose what feels most comfortable and the staff will help to make sure if fits. Here: are some examples of the different masks that are available: Magnetic mask aids may assist with use but there are safety issues that should be addressed when considering with magnets* ( see end of discussion).       Key points to remember on your journey with sleep apnea:  Sleep study.  PAP devices often need to be adjusted during a sleep study to show that they are effective and adjusted  right.  Good tips to remember: Try wearing just the mask during a quiet time during the day so your body adapts to wearing it. A humidifier is recommended for comfort in most cases to prevent drying of your nose and throat. Allergy medication from your provider may help you if you are having nasal congestion.  Getting settled-in. It takes more than one night for most of us to get used to wearing a mask. Try wearing just the mask during a quiet time during the day so your body adapts to wearing it. A humidifier is recommended for comfort in most cases. Our team will work with you carefully on the first day and will be in contact within 4 days and again at 2 and 4 weeks for advice and remote device adjustments. Your therapy is evaluated by the device each day.   Use it every night. The more you are able to sleep naturally for 7-8 hours, the more likely you will have good sleep and to prevent health risks or symptoms from sleep apnea. Even if you use it 4 hours it helps. Occasionally all of us are unable to use a medical therapy, in sleep apnea, it is not dangerous to miss one night.   Communicate. Call our skilled team on the number provided on the first day if your visit for problems that make it difficult to wear the device. Over 2 out of 3 patients can learn to wear the device long-term with help from our team. Remember to call our team or your sleep providers if you are unable to wear the device as we may have other solutions for those who cannot adapt to mask CPAP therapy. It is recommended that you sleep your sleep provider within the first 3 months and yearly after that if you are not having problems.   Use it for your health. We encourage use of CPAP masks during daytime quiet periods to allow your face and brain to adapt to the sensation of CPAP so that it will be a more natural sensation to awaken to at night or during naps. This can be very useful during the first few weeks or months of adapting to CPAP  though it does not help medically to wear CPAP during wakefulness and  should not be used as a strategy just to meet guidelines.  Take care of your equipment. Make sure you clean your mask and tubing using directions every day and that your filter and mask are replaced as recommended or if they are not working.     *Masks with magnets:  Updated Contraindications  Masks with magnetic components are contraindicated for use by patients where they, or anyone in close physical contact while using the mask, have the following:   Active medical implants that interact with magnets (i.e., pacemakers, implantable cardioverter defibrillators (ICD), neurostimulators, cerebrospinal fluid (CSF) shunts, insulin/infusion pumps)   Metallic implants/objects containing ferromagnetic material (i.e., aneurysm clips/flow disruption devices, embolic coils, stents, valves, electrodes, implants to restore hearing or balance with implanted magnets, ocular implants, metallic splinters in the eye)  Updated Warning  Keep the mask magnets at a safe distance of at least 6 inches (150 mm) away from implants or medical devices that may be adversely affected by magnetic interference. This warning applies to you or anyone in close physical contact with your mask. The magnets are in the frame and lower headgear clips, with a magnetic field strength of up to 400mT. When worn, they connect to secure the mask but may inadvertently detach while asleep.  Implants/medical devices, including those listed within contraindications, may be adversely affected if they change function under external magnetic fields or contain ferromagnetic materials that attract/repel to magnetic fields (some metallic implants, e.g., contact lenses with metal, dental implants, metallic cranial plates, screws, max hole covers, and bone substitute devices). Consult your physician and  of your implant / other medical device for information on the potential adverse  effects of magnetic fields.    BESIDES CPAP, WHAT OTHER THERAPIES ARE THERE?    Positioning Device  Positioning devices are generally used when sleep apnea is mild and only occurs on your back.This example shows a pillow that straps around the waist. It may be appropriate for those whose sleep study shows milder sleep apnea that occurs primarily when lying flat on one's back. Preliminary studies have shown benefit but effectiveness at home may need to be verified by a home sleep test. These devices are generally not covered by medical insurance.  Examples of devices that maintain sleeping on the back to prevent snoring and mild sleep apnea.    Belt type body positioner  http://[employer]/    Electronic reminder  http://nightshifttherapy.com/            Oral Appliance  What is oral appliance therapy?  An oral appliance device fits on your teeth at night like a retainer used after having braces. The device is made by a specialized dentist and requires several visits over 1-2 months before a manufactured device is made to fit your teeth and is adjusted to prevent your sleep apnea. Once an oral device is working properly, snoring should be improved. A home sleep test may be recommended at that time if to determine whether the sleep apnea is adequately treated.       Some things to remember:  -Oral devices are often, but not always, covered by your medical insurance. Be sure to check with your insurance provider.   -If you are referred for oral therapy, you will be given a list of specialized dentists to consider or you may choose to visit the Web site of the American Academy of Dental Sleep Medicine  -Oral devices are less likely to work if you have severe sleep apnea or are extremely overweight.     More detailed information  An oral appliance is a small acrylic device that fits over the upper and lower teeth  (similar to a retainer or a mouth guard). This device slightly moves jaw forward, which moves the base of  the tongue forward, opens the airway, improves breathing for effective treat snoring and obstructive sleep apnea in perhaps 7 out of 10 people .  The best working devices are custom-made by a dental device  after a mold is made of the teeth 1, 2, 3.  When is an oral appliance indicated?  Oral appliance therapy is recommended as a first-line treatment for patients with primary snoring, mild sleep apnea, and for patients with moderate sleep apnea who prefer appliance therapy to use of CPAP4, 5. Severity of sleep apnea is determined by sleep testing and is based on the number of respiratory events per hour of sleep.   How successful is oral appliance therapy?  The success rate of oral appliance therapy in patients with mild sleep apnea is 75-80% while in patients with moderate sleep apnea it is 50-70%. The chance of success in patients with severe sleep apnea is 40-50%. The research also shows that oral appliances have a beneficial effect on the cardiovascular health of CURT patients at the same magnitude as CPAP therapy7.  Oral appliances should be a second-line treatment in cases of severe sleep apnea, but if not completely successful then a combination therapy utilizing CPAP plus oral appliance therapy may be effective. Oral appliances tend to be effective in a broad range of patients although studies show that the patients who have the highest success are females, younger patients, those with milder disease, and less severe obesity. 3, 6.   Finding a dentist that practices dental sleep medicine  Specific training is available through the American Academy of Dental Sleep Medicine for dentists interested in working in the field of sleep. To find a dentist who is educated in the field of sleep and the use of oral appliances, near you, visit the Web site of the American Academy of Dental Sleep Medicine.    References  1. Demetrio et al. Objectively measured vs self-reported compliance during oral  appliance therapy for sleep-disordered breathing. Chest 2013; 144(5): 9016-9688.  2. Pedro, et al. Objective measurement of compliance during oral appliance therapy for sleep-disordered breathing. Thorax 2013; 68(1): 91-96.  3. Gabriel et al. Mandibular advancement devices in 620 men and women with CURT and snoring: tolerability and predictors of treatment success. Chest 2004; 125: 0809-1663.  4. Kaya, et al. Oral appliances for snoring and CURT: a review. Sleep 2006; 29: 244-262.  5. Jolene et al. Oral appliance treatment for CURT: an update. J Clin Sleep Med 2014; 10(2): 215-227.  6. Kimmy et al. Predictors of OSAH treatment outcome. J Dent Res 2007; 86: 9562-4758.      Weight Loss:   Your Body mass index is 50.18 kg/m .    Being overweight does not necessarily mean you will have health consequences.  Those who have BMI over 35 or over 27 with existing medical conditions carries greater risk.   Weight loss decreases severity of sleep apnea in most people with obesity. For those with mild obesity who have developed snoring with weight gain, even 15-30 pound weight loss can improve and occasionally milder eliminate sleep apnea.  Structured and life-long dietary and health habits are necessary to lose weight and keep healthier weight levels.     The Comprehensive Weight loss program offers all aspects of weight loss strategies including two Non-Surgical Weight Loss Programs: Medical Weight Management and our 24 Week Healthy Lifestyle Program:    Medical Weight Management: You will meet with a Medical Weight Management Provider, as well as a Registered Dietician. The program may include medication therapy, dietary education, recommended exercise and physical therapy programs, monthly support group meetings, and possible psychological counseling. Follow up visits with the provider or dietician are scheduled based on your progress and needs.    24 Week Healthy Lifestyle Program: This unique program is  designed to give you the support of weekly appointments and activities thru a 24-week period. It may include all of the components of the basic program (above), with the addition of 11 individual Health  Visits, 24-week access to the [employer] website for over 700 online classes, and monthly support group meetings. This program has an out-of-pocket expense of $499 to cover the items that can not be billed to insurance (health coaches and [employer] access), and is non-refundable/non-transferable (you may be able to use a Health Savings Account; ask your HSA provider). There may be an optional meal replacement plan prescribed as well.   Surgical management achieves meaningful long-term weight loss and improvement in health risks in most patients with more severe obesity.      Sleep Apnea Surgery:    Surgery for obstructive sleep apnea is considered generally only when other therapies fail to work. Surgery may be discussed with you if you are having a difficult time tolerating CPAP and or when there is an abnormal structure that requires surgical correction.  Nose and throat surgeries often enlarge the airway to prevent collapse.  Most of these surgeries create pain for 1-2 weeks and up to half of the most common surgeries are not effective throughout life.  You should carefully discuss the benefits and drawbacks to surgery with your sleep provider and surgeon to determine if it is the best solution for you.   More information  Surgery for CURT is directed at areas that are responsible for narrowing or complete obstruction of the airway during sleep.  There are a wide range of procedures available to enlarge and/or stabilize the airway to prevent blockage of breathing in the three major areas where it can occur: the palate, tongue, and nasal regions.  Successful surgical treatment depends on the accurate identification of the factors responsible for obstructive sleep apnea in each person.  A personalized approach  is required because there is no single treatment that works well for everyone.  Because of anatomic variation, consultation with an examination by a sleep surgeon is a critical first step in determining what surgical options are best for each patient.  In some cases, examination during sedation may be recommended in order to guide the selection of procedures.  Patients will be counseled about risks and benefits as well as the typical recovery course after surgery. Surgery is typically not a cure for a person s CURT.  However, surgery will often significantly improve one s CURT severity (termed  success rate ).  Even in the absence of a cure, surgery will decrease the cardiovascular risk associated with OSA7; improve overall quality of life8 (sleepiness, functionality, sleep quality, etc).      Palate Procedures:  Patients with CURT often have narrowing of their airway in the region of their tonsils and uvula.  The goals of palate procedures are to widen the airway in this region as well as to help the tissues resist collapse.  Modern palate procedure techniques focus on tissue conservation and soft tissue rearrangement, rather than tissue removal.  Often the uvula is preserved in this procedure. Residual sleep apnea is common in patient after pharyngoplasty with an average reduction in sleep apnea events of 33%2.      Tongue Procedures:  ExamWhile patients are awake, the muscles that surround the throat are active and keep this region open for breathing. These muscles relax during sleep, allowing the tongue and other structures to collapse and block breathing.  There are several different tongue procedures available.  Selection of a tongue base procedure depends on characteristics seen on physical exam.  Generally, procedures are aimed at removing bulky tissues in this area or preventing the back of the tongue from falling back during sleep.  Success rates for tongue surgery range from 50-62%3.    Hypoglossal Nerve  Stimulation:  Hypoglossal nerve stimulation has recently received approval from the [place] Food and Drug Administration for the treatment of obstructive sleep apnea.  This is based on research showing that the system was safe and effective in treating sleep apnea6.  Results showed that the median AHI score decreased 68%, from 29.3 to 9.0. This therapy uses an implant system that senses breathing patterns and delivers mild stimulation to airway muscles, which keeps the airway open during sleep.  The system consists of three fully implanted components: a small generator (similar in size to a pacemaker), a breathing sensor, and a stimulation lead.  Using a small handheld remote, a patient turns the therapy on before bed and off upon awakening.    Candidates for this device must be greater than 18 years of age, have moderate to severe obstructive sleep apnea with less than 25% central events  (AHI between 15-65), BMI less than 35, have tried CPAP/oral appliance for at least 8 weeks without success, and have appropriate upper airway anatomy (determined by a sleep endoscopy performed by Dr. Dejuan Bernardo or Dr. Colt Roberts).    Nasal Procedures:  Nasal obstruction can interfere with nasal breathing during the day and night.  Studies have shown that relief of nasal obstruction can improve the ability of some patients to tolerate positive airway pressure therapy for obstructive sleep apnea1.  Treatment options include medications such as nasal saline, topical corticosteroid and antihistamine sprays, and oral medications such as antihistamines or decongestants. Non-surgical treatments can include external nasal dilators for selected patients. If these are not successful by themselves, surgery can improve the nasal airway either alone or in combination with these other options.        Combination Procedures:  Combination of surgical procedures and other treatments may be recommended, particularly if patients have more  than one area of narrowing or persistent positional disease.  The success rate of combination surgery ranges from 66-80%2,3.    References  Latisha BELLA. The Role of the Nose in Snoring and Obstructive Sleep Apnoea: An Update.  Eur Arch Otorhinolaryngol. 2011; 268: 1365-73.   Diaz SM; Nasir JA; Ines JR; Pallanch JF; Rivka MB; Angel SG; Emily MUELLER. Surgical modifications of the upper airway for obstructive sleep apnea in adults: a systematic review and meta-analysis. SLEEP 2010;33(10):2274-0856. Luh SPENCE. Hypopharyngeal surgery in obstructive sleep apnea: an evidence-based medicine review.  Arch Otolaryngol Head Neck Surg. 2006 Feb;132(2):206-13.  Erwin YH1, Avinash Y, Teodoro CHAPO. The efficacy of anatomically based multilevel surgery for obstructive sleep apnea. Otolaryngol Head Neck Surg. 2003 Oct;129(4):327-35.  Kezirian E, Goldberg A. Hypopharyngeal Surgery in Obstructive Sleep Apnea: An Evidence-Based Medicine Review. Arch Otolaryngol Head Neck Surg. 2006 Feb;132(2):206-13.  Alix PJ et al. Upper-Airway Stimulation for Obstructive Sleep Apnea.  N Engl J Med. 2014 Jan 9;370(2):139-49.  Naheed Y et al. Increased Incidence of Cardiovascular Disease in Middle-aged Men with Obstructive Sleep Apnea. Am J Respir Crit Care Med; 2002 166: 159-165  Abrams EM et al. Studying Life Effects and Effectiveness of Palatopharyngoplasty (SLEEP) study: Subjective Outcomes of Isolated Uvulopalatopharyngoplasty. Otolaryngol Head Neck Surg. 2011; 144: 623-631.        WHAT IF I ONLY HAVE SNORING?    Mandibular advancement devices, lateral sleep positioning, long-term weight loss and treatment of nasal allergies have been shown to improve snoring.  Exercising tongue muscles with a game (https://apps.100e.com/us/michaelle/soundly-reduce-snoring/my7043514941) or stimulating the tongue during the day with a device (https://doi.org/10.3390/gws48901286) have improved snoring in some  individuals.  https://www.[employer]/  https://www.sleepfoundation.org/best-anti-snoring-mouthpieces-and-mouthguards    Remember to Drive Safe... Drive Alive     Sleep health profoundly affects your health, mood, and your safety.  Thirty three percent of the population (one in three of us) is not getting enough sleep and many have a sleep disorder. Not getting enough sleep or having an untreated / undertreated sleep condition may make us sleepy without even knowing it. In fact, our driving could be dramatically impaired due to our sleep health. As your provider, here are some things I would like you to know about driving:     Here are some warning signs for impairment and dangerous drowsy driving:              -Having been awake more than 16 hours               -Looking tired               -Eyelid drooping              -Head nodding (it could be too late at this point)              -Driving for more than 30 minutes     Some things you could do to make the driving safer if you are experiencing some drowsiness:              -Stop driving and rest              -Call for transportation              -Make sure your sleep disorder is adequately treated     Some things that have been shown NOT to work when experiencing drowsiness while driving:              -Turning on the radio              -Opening windows              -Eating any  distracting  /  entertaining  foods (e.g., sunflower seeds, candy, or any other)              -Talking on the phone      Your decision may not only impact your life, but also the life of others. Please, remember to drive safe for yourself and all of us.

## 2025-01-15 NOTE — LETTER
1/15/2025      Eduin Pitts  [address]  [facility] [address]      Dear Colleague,    Thank you for referring your patient, Eduin Pitts, to the [facility]. Please see a copy of my visit note below.    Virtual Visit Details    Type of service:  Video Visit     Originating Location (pt. Location): Home    Distant Location (provider location):  Off-site  Platform used for Video Visit: Nicolette    Chief complaint:Consultation requested by LINDA Pryor CNP for evaluation of snoring, waking up choking, sleep apnea    Comprehensive sleep medicine questionnaire reviewed    History of Present Illness: 34-year-old gentleman with history of obesity, paroxysmal atrial fibrillation and recent diagnosis of hypothyroidism.  Started having atrial fibrillation back in 2013.  He recalls being evaluated for sleep apnea and being found to have mild sleep apnea by in-lab sleep study in the [facility] system [place] ([place]).  He was told that it was mild and was recommended to have surgery.  He had tonsillectomy and uvulectomy.  No repeat testing that he recalls.  He thinks he has gained weight since that time.  He is continue to have occasional paroxysms of atrial fibrillation most recently a few months ago.  He has been snoring loudly and having observed apneas by his wife.  He also has woken himself up with a choking sensation.  He often have dry mouth and sore throat in the morning.  He denies morning headaches.  Sleep quality is somewhat poor.  He feels like he wakes up frequently at night and needs to change body positions.  He will get up at least once usually twice to go to the bathroom.  He denies difficulty returning to sleep.  He is not taking any sleep aids.  He is also avoiding caffeine during the day.  He does feel tired and irritable due to poor quality sleep.    Typical bedtimes around 11 PM with rise time around 7 30-8 AM.  He is not taking naps  during the day nor does he find himself excessively drowsy.    He has a history of some sleep talking but no sleepwalking, dream enactment behavior or clear restless legs.    Lawrence Sleepiness Scale   Sitting and reading: (Patient-Rptd) Slight chance of dozing   Watching TV: (Patient-Rptd) Slight chance of dozing   Sitting, inactive in a public place (e.g. a theatre or a meeting): (Patient-Rptd) Would never doze   As a passenger in a car for an hour without a break: (Patient-Rptd) Slight chance of dozing   Lying down to rest in the afternoon when circumstances permit: (Patient-Rptd) Slight chance of dozing   Sitting and talking to someone: (Patient-Rptd) Would never doze   Sitting quietly after a lunch without alcohol: (Patient-Rptd) Would never doze   In a car, while stopped for a few minutes in traffic: (Patient-Rptd) Would never doze   Total score - Lawrence: (Patient-Rptd) 4  (Less than 10 normal)    Insomnia Severity Scale  CADEN Total Score: (Patient-Rptd) 10  Total score categories:  0-7 = No clinically significant insomnia   8-14 = Subthreshold insomnia   15-21 = Clinical insomnia (moderate severity)  22-28 = Clinical insomnia (severe)    STOP-BANG  Loud Snore   1  Excessively Tired/Sleepy   1  Observed apnea   1  Hypertension   0  BMI> 35 kg/m2   1  Age >50   0  Neck >16 in/40cm   ?  Male Gender   1  Total =   5  (0-2 low, 3-4 intermediate, 5-8 high risk of CURT)      Past Medical History:   Diagnosis Date     Compensated hypothyroidism      Hyperlipidemia LDL goal <130      Morbid obesity (H)      CURT (obstructive sleep apnea)     mild no treatment     Paroxysmal atrial fibrillation (H)        No Known Allergies    Current Outpatient Medications   Medication Sig Dispense Refill     levothyroxine (SYNTHROID/LEVOTHROID) 75 MCG tablet Take 1 tablet (75 mcg) by mouth every morning (before breakfast). 90 tablet 0     nystatin (MYCOSTATIN) 207087 UNIT/GM external cream Apply topically 2 times daily. 30 g 3     No  current facility-administered medications for this visit.       Social History     Socioeconomic History     Marital status:      Spouse name: Not on file     Number of children: Not on file     Years of education: Not on file     Highest education level: Not on file   Occupational History     Not on file   Tobacco Use     Smoking status: Never     Passive exposure: Never     Smokeless tobacco: Never   Vaping Use     Vaping status: Never Used   Substance and Sexual Activity     Alcohol use: Not Currently     Comment: rare     Drug use: No     Comment: caffeine  none     Sexual activity: Not on file   Other Topics Concern     Not on file   Social History Narrative     Not on file     Social Drivers of Health     Financial Resource Strain: Low Risk  (10/30/2024)    Financial Resource Strain      Within the past 12 months, have you or your family members you live with been unable to get utilities (heat, electricity) when it was really needed?: No   Food Insecurity: Low Risk  (10/30/2024)    Food Insecurity      Within the past 12 months, did you worry that your food would run out before you got money to buy more?: No      Within the past 12 months, did the food you bought just not last and you didn t have money to get more?: No   Transportation Needs: Low Risk  (10/30/2024)    Transportation Needs      Within the past 12 months, has lack of transportation kept you from medical appointments, getting your medicines, non-medical meetings or appointments, work, or from getting things that you need?: No   Physical Activity: Insufficiently Active (10/30/2024)    Exercise Vital Sign      Days of Exercise per Week: 2 days      Minutes of Exercise per Session: 20 min   Stress: No Stress Concern Present (10/30/2024)    [employer] [place] [employer] - Occupational Stress Questionnaire      Feeling of Stress : Not at all   Social Connections: Unknown (10/30/2024)    Social Connection and Isolation Panel [NHANES]  "     Frequency of Communication with Friends and Family: Not on file      Frequency of Social Gatherings with Friends and Family: Once a week      Attends Rastafari Services: Not on file      Active Member of Clubs or Organizations: Not on file      Attends Club or Organization Meetings: Not on file      Marital Status: Not on file   Interpersonal Safety: Low Risk  (10/30/2024)    Interpersonal Safety      Do you feel physically and emotionally safe where you currently live?: Yes      Within the past 12 months, have you been hit, slapped, kicked or otherwise physically hurt by someone?: Patient unable to answer      Within the past 12 months, have you been humiliated or emotionally abused in other ways by your partner or ex-partner?: Patient unable to answer   Housing Stability: Low Risk  (10/30/2024)    Housing Stability      Do you have housing? : Yes      Are you worried about losing your housing?: No       Family History   Problem Relation Age of Onset     Diabetes Mother      No Known Problems Father      No Known Problems Brother      Diabetes Paternal Grandmother          EXAM:  Ht 1.829 m (6')   Wt (!) 167.8 kg (370 lb)   BMI 50.18 kg/m    GENERAL: Alert and no distress  EYES: Eyes grossly normal to inspection.  No discharge or erythema, or obvious scleral/conjunctival abnormalities.  RESP: No audible wheeze, cough, or visible cyanosis.    SKIN: Visible skin clear. No significant rash, abnormal pigmentation or lesions.  NEURO: Cranial nerves grossly intact.  Mentation and speech appropriate for age.  PSYCH: Appropriate affect, tone, and pace of words       PSG 2013? [place] (Requested)  Mild CURT        TSH   Date Value Ref [place] Status   12/27/2024 15.30 (H) 0.30 - 4.20 uIU/mL Final   08/24/2013 2.09 0.34 - 4.82 mU/L Final     No results found for: \"MALCOLM\"  Lab Results   Component Value Date    A1C 5.2 12/27/2024           ASSESSMENT:  34-year-old gentleman with history of paroxysmal atrial " fibrillation, obesity, snoring, observed apnea, waking up gasping.  History of mild sleep apnea with treatment surgically about 12 years ago.  Recent identification of hypothyroidism with improving TSH.  Identifying and treating obstructive sleep apnea if present is medically indicated.    PLAN:  Reviewed the pathophysiology of obstructive sleep apnea, indications for treatment, testing options and treatment options.  Recommended home sleep apnea testing.  In the meantime, continue efforts at weight loss, side sleeping, and ensuring adequate sleep time.  I will be contacting patient with results of the home sleep apnea test via [employer] as soon as they become available.  Patient is open to a trial of CPAP I will go ahead and put in orders to try to expedite things if indicated.  He did then be enrolled in the sleep therapy management program.  See instructions for further details of counseling provided.      38 minutes spent by me on the date of the encounter doing chart review, history and exam, documentation and further activities per the note    Emma Robbins M.D.  Pulmonary/Critical Care/Sleep Medicine    [facility]   Floor 1, [address]   [address] [place], [place] [address]. [place] [address]   Appointments: [phone]    The above note was dictated using voice recognition software and may include typographical errors. Please contact the author for any clarifications.              Again, thank you for allowing me to participate in the care of your patient.        Sincerely,        Emma Robbins MD    Electronically signed

## 2025-01-15 NOTE — NURSING NOTE
Current patient location: [address] [place] [address]   [facility] [address]    Is the patient currently in the state of [place]? YES    Visit mode: VIDEO    If the visit is dropped, the patient can be reconnected by:VIDEO VISIT: Text to cell phone:   Telephone Information:   Mobile [phone]       Will anyone else be joining the visit? NO  (If patient encounters technical issues they should call [phone])    Are changes needed to the allergy or medication list? No    Are refills needed on medications prescribed by this physician? NO    Rooming Documentation:  Questionnaire(s) completed    Reason for visit: Consult    Celio CRAINF

## 2025-03-01 ENCOUNTER — LAB (OUTPATIENT)
Dept: LAB | Facility: CLINIC | Age: 35
End: 2025-03-01
Payer: COMMERCIAL

## 2025-03-01 DIAGNOSIS — Z11.59 NEED FOR HEPATITIS C SCREENING TEST: ICD-10-CM

## 2025-03-01 DIAGNOSIS — R79.89 ELEVATED SERUM CREATININE: ICD-10-CM

## 2025-03-01 DIAGNOSIS — Z11.4 SCREENING FOR HIV (HUMAN IMMUNODEFICIENCY VIRUS): ICD-10-CM

## 2025-03-01 DIAGNOSIS — E03.9 ACQUIRED HYPOTHYROIDISM: Primary | ICD-10-CM

## 2025-03-01 LAB
CREAT SERPL-MCNC: 1.08 MG/DL (ref 0.67–1.17)
EGFRCR SERPLBLD CKD-EPI 2021: >90 ML/MIN/1.73M2
HCV AB SERPL QL IA: NONREACTIVE
HIV 1+2 AB+HIV1 P24 AG SERPL QL IA: NONREACTIVE
T4 FREE SERPL-MCNC: 1.35 NG/DL (ref 0.9–1.7)
TSH SERPL DL<=0.005 MIU/L-ACNC: 9.21 UIU/ML (ref 0.3–4.2)

## 2025-03-01 PROCEDURE — 82565 ASSAY OF CREATININE: CPT

## 2025-03-01 PROCEDURE — 86803 HEPATITIS C AB TEST: CPT

## 2025-03-01 PROCEDURE — 87389 HIV-1 AG W/HIV-1&-2 AB AG IA: CPT

## 2025-03-01 PROCEDURE — 36415 COLL VENOUS BLD VENIPUNCTURE: CPT

## 2025-03-01 PROCEDURE — 84443 ASSAY THYROID STIM HORMONE: CPT

## 2025-03-01 PROCEDURE — 84439 ASSAY OF FREE THYROXINE: CPT

## 2025-03-02 RX ORDER — LEVOTHYROXINE SODIUM 100 UG/1
100 TABLET ORAL
Qty: 90 TABLET | Refills: 0 | Status: SHIPPED | OUTPATIENT
Start: 2025-03-02

## 2025-03-03 ENCOUNTER — TELEPHONE (OUTPATIENT)
Dept: FAMILY MEDICINE | Facility: CLINIC | Age: 35
End: 2025-03-03
Payer: COMMERCIAL

## 2025-03-03 NOTE — TELEPHONE ENCOUNTER
Left message for patient to return call. If patient calls back, please route to [facility].     TC please route to RN.    Makayla Nuñez RN  [facility]

## 2025-03-03 NOTE — TELEPHONE ENCOUNTER
----- Message from Mayra De La Rosa sent at 3/3/2025  6:47 AM CST -----  Patient hasn't viewed Liztic results message- please call with results and recommendations.       Eduin,  Your thyroid numbers are improving but still under-replaced.  Increase levothyroxine to 100 mcg/day (from 75 mcg- Rx sent to pharmacy) then recheck again in 2-3 months (lab only appointment is fine).     Take care,  Mayra De La Rosa, CNP

## 2025-03-05 NOTE — TELEPHONE ENCOUNTER
Left message to return call. If patient calls back, please route to [facility].     TCs, please send to RNs.    Marla Rojas RN  [facility]

## 2025-03-06 NOTE — RESULT ENCOUNTER NOTE
Bravo Denny,    HIV and Hepatitis C screening tests were negative.    Karina Lynch, DNP, APRN, CNP

## 2025-03-06 NOTE — TELEPHONE ENCOUNTER
The patient has viewed his lab results as of 3/3/25 at 4:04 PM.    Marla Rojas RN  [facility]

## 2025-04-08 ENCOUNTER — VIRTUAL VISIT (OUTPATIENT)
Dept: FAMILY MEDICINE | Facility: CLINIC | Age: 35
End: 2025-04-08
Payer: COMMERCIAL

## 2025-04-08 DIAGNOSIS — I48.91 ATRIAL FIBRILLATION WITH RAPID VENTRICULAR RESPONSE (H): ICD-10-CM

## 2025-04-08 DIAGNOSIS — E03.9 ACQUIRED HYPOTHYROIDISM: ICD-10-CM

## 2025-04-08 DIAGNOSIS — G47.33 OBSTRUCTIVE SLEEP APNEA SYNDROME: ICD-10-CM

## 2025-04-08 DIAGNOSIS — E66.01 MORBID OBESITY (H): Primary | ICD-10-CM

## 2025-04-08 PROCEDURE — 1126F AMNT PAIN NOTED NONE PRSNT: CPT | Mod: 95 | Performed by: NURSE PRACTITIONER

## 2025-04-08 PROCEDURE — 98005 SYNCH AUDIO-VIDEO EST LOW 20: CPT | Performed by: NURSE PRACTITIONER

## 2025-04-08 RX ORDER — AMOXICILLIN 500 MG/1
500 TABLET, FILM COATED ORAL 3 TIMES DAILY
COMMUNITY
Start: 2025-04-02

## 2025-04-08 NOTE — PROGRESS NOTES
Eduin is a 34 year old who is being evaluated via a billable video visit.    How would you like to obtain your AVS? MyChart  If the video visit is dropped, the invitation should be resent by: Text to cell phone: [phone]  Will anyone else be joining your video visit? No        Subjective   Eduin is a 34 year old, presenting for the following health issues:  Weight Loss Medication (Would like to get started)      4/8/2025    12:44 PM   Additional Questions   Roomed by  LPN     HPI    Would like to start weight loss medication.  Has been trying to lose weight with diet and exercise without success.  Fatigue, weight gain, sleep apnea.        Objective    Vitals - Patient Reported  Weight (Patient Reported): 169.6 kg (374 lb)  Pain Score: No Pain (0)        Physical Exam   GENERAL: alert and no distress  EYES: Eyes grossly normal to inspection.  No discharge or erythema, or obvious scleral/conjunctival abnormalities.  RESP: No audible wheeze, cough, or visible cyanosis.    SKIN: Visible skin clear. No significant rash, abnormal pigmentation or lesions.  NEURO: Cranial nerves grossly intact.  Mentation and speech appropriate for age.  PSYCH: Appropriate affect, tone, and pace of words    Lab on 03/01/2025   Component Date Value Ref [place] Status    TSH 03/01/2025 9.21 (H)  0.30 - 4.20 uIU/mL Final    Creatinine 03/01/2025 1.08  0.67 - 1.17 mg/dL Final    GFR Estimate 03/01/2025 >90  >60 mL/min/1.73m2 Final    eGFR calculated using 2021 CKD-EPI equation.    HIV Antigen Antibody Combo 03/01/2025 Nonreactive  Nonreactive Final    Negative HIV-1 p24 antigen and HIV-1/2 antibody screening test results usually indicate the absence of HIV-1 and HIV-2 infection. However, such negative results do not rule-out acute HIV infection.  If acute HIV-1 or HIV-2 infection is suspected, detection of HIV-1 or HIV-2 RNA  is recommended. This result is obtained using the Roche Elecsys HIV Duo method on the walter e801 immunoassay  analyzer.    Hepatitis C Antibody 03/01/2025 Nonreactive  Nonreactive Final    A nonreactive screening test result does not exclude the possibility of exposure to or infection with HCV. Nonreactive screening test results in individuals with prior exposure to HCV may be due to antibody levels below the limit of detection of this assay or lack of reactivity to the HCV antigens used in this assay. Patients with recent HCV infections (<3 months from time of exposure) may have false-negative HCV antibody results due to the time needed for seroconversion (average of 8 to 9 weeks).    Free T4 03/01/2025 1.35  0.90 - 1.70 ng/dL Final       A/P:  1. Morbid obesity (H) (Primary)  Instructed on use of medication, potential side effects and adverse reactions.    - tirzepatide-Weight Management (ZEPBOUND) 2.5 MG/0.5ML prefilled pen; Inject 0.5 mLs (2.5 mg) subcutaneously every 7 days.  Dispense: 2 mL; Refill: 0  - tirzepatide-Weight Management (ZEPBOUND) 5 MG/0.5ML prefilled pen; Inject 0.5 mLs (5 mg) subcutaneously every 7 days.  Dispense: 2 mL; Refill: 0  - tirzepatide-Weight Management (ZEPBOUND) 7.5 MG/0.5ML prefilled pen; Inject 0.5 mLs (7.5 mg) subcutaneously every 7 days.  Dispense: 2 mL; Refill: 0    2. Obstructive sleep apnea syndrome  Weight loss  - tirzepatide-Weight Management (ZEPBOUND) 2.5 MG/0.5ML prefilled pen; Inject 0.5 mLs (2.5 mg) subcutaneously every 7 days.  Dispense: 2 mL; Refill: 0  - tirzepatide-Weight Management (ZEPBOUND) 5 MG/0.5ML prefilled pen; Inject 0.5 mLs (5 mg) subcutaneously every 7 days.  Dispense: 2 mL; Refill: 0  - tirzepatide-Weight Management (ZEPBOUND) 7.5 MG/0.5ML prefilled pen; Inject 0.5 mLs (7.5 mg) subcutaneously every 7 days.  Dispense: 2 mL; Refill: 0    3. Atrial fibrillation with rapid ventricular response (H)  Stable    4. Acquired hypothyroidism  Due for TSH recheck    Video-Visit Details    Type of service:  Video Visit   Originating Location (pt. Location): Home    Distant  Location (provider location):  On-site  Platform used for Video Visit: Nicolette  Signed Electronically by: Mayra De La Rosa CNP

## 2025-04-16 ENCOUNTER — TELEPHONE (OUTPATIENT)
Dept: FAMILY MEDICINE | Facility: CLINIC | Age: 35
End: 2025-04-16
Payer: COMMERCIAL

## 2025-04-16 NOTE — TELEPHONE ENCOUNTER
Prior Authorization Retail Medication Request    Medication/Dose: tirzepatide-Weight Management (ZEPBOUND) 2.5 MG/0.5ML prefilled pen  Diagnosis and ICD code (if different than what is on RX):  see chart  New/renewal/insurance change PA/secondary ins. PA:  Previously Tried and Failed:  see chart  Rationale:  see chart    Insurance   Primary: [facility]  Insurance ID:  [id number]       Clinic Information  Preferred routing pool for dept communication: [facility]    CMM Key : [id number]

## 2025-04-20 NOTE — TELEPHONE ENCOUNTER
Retail Pharmacy Prior Authorization Team   Phone: [phone]    PA Initiation    Medication: ZEPBOUND 2.5 MG/0.5ML [place] SOAJ  Insurance Company: [employer] - Phone [phone] Fax [phone]  Pharmacy Filling the Rx: [facility] [address] [place] [address].  Filling Pharmacy Phone: [phone]  Filling Pharmacy Fax:    Start Date: 4/20/2025    [id number]

## 2025-04-22 NOTE — TELEPHONE ENCOUNTER
Contacts       Contact Date/Time Type Contact Phone/Fax    04/22/2025 02:38 PM CDT Phone (Outgoing) Eduin Pitts (Self) [phone] (H)    Left Message           Attempted to reach patient to: Relay a message    Regarding: Prior Authorization for Zepbound    Action to take:   Drug is excluded from patient's plan. Patient must try and fail: Saxenda, Phentermine, Qsymia. Excluded drugs are not eligible for appeal.   Would patient be willing to try one of these other medications?    Shayy Hancock, CMA

## 2025-04-22 NOTE — TELEPHONE ENCOUNTER
PRIOR AUTHORIZATION DENIED    Medication: ZEPBOUND 2.5 MG/0.5ML [place] SOAJ  Insurance Company: [employer] - Phone [phone] Fax [phone]  Denial Date: 4/22/2025  Denial Reason(s):     Drug is excluded from plan per call to [facility]. Patient must try and fail, Saxenda, Phentermine, Qsymia.    Appeal Information: Excluded drugs are not eligible for appeal   Patient Notified: No

## 2025-04-23 NOTE — TELEPHONE ENCOUNTER
Call placed to Patient regarding the PA for the Zepbound, Asked Patient to return our call.   Margaret Perez LPN on 4/23/2025 at 3:34 PM

## 2025-04-25 NOTE — TELEPHONE ENCOUNTER
Call placed to Patient left message to return our call for information relating to Zepbound.   Margaret Perez LPN on 4/25/2025 at 11:55 AM

## 2025-05-05 NOTE — TELEPHONE ENCOUNTER
Left message to call back for: PA denied Zepbound  Information to relay to patient: Left message for Patient to return call.  Margaret Perez LPN on 5/5/2025 at 8:35 AM

## 2025-08-21 ENCOUNTER — OFFICE VISIT (OUTPATIENT)
Dept: FAMILY MEDICINE | Facility: CLINIC | Age: 35
End: 2025-08-21
Payer: COMMERCIAL

## 2025-08-21 ENCOUNTER — MYC MEDICAL ADVICE (OUTPATIENT)
Dept: FAMILY MEDICINE | Facility: CLINIC | Age: 35
End: 2025-08-21

## 2025-08-21 VITALS
DIASTOLIC BLOOD PRESSURE: 83 MMHG | HEART RATE: 60 BPM | WEIGHT: 315 LBS | SYSTOLIC BLOOD PRESSURE: 114 MMHG | OXYGEN SATURATION: 95 % | BODY MASS INDEX: 51.4 KG/M2 | RESPIRATION RATE: 14 BRPM | TEMPERATURE: 98.5 F

## 2025-08-21 DIAGNOSIS — H69.92 DYSFUNCTION OF LEFT EUSTACHIAN TUBE: Primary | ICD-10-CM

## 2025-08-21 DIAGNOSIS — H60.92 OTITIS EXTERNA OF LEFT EAR, UNSPECIFIED CHRONICITY, UNSPECIFIED TYPE: Primary | ICD-10-CM

## 2025-08-21 DIAGNOSIS — H60.92 OTITIS EXTERNA OF LEFT EAR, UNSPECIFIED CHRONICITY, UNSPECIFIED TYPE: ICD-10-CM

## 2025-08-21 RX ORDER — OFLOXACIN 3 MG/ML
10 SOLUTION AURICULAR (OTIC) DAILY
Qty: 10 ML | Refills: 0 | Status: SHIPPED | OUTPATIENT
Start: 2025-08-21

## 2025-08-21 RX ORDER — FLUTICASONE PROPIONATE 50 MCG
1 SPRAY, SUSPENSION (ML) NASAL DAILY
Qty: 16 G | Refills: 0 | Status: SHIPPED | OUTPATIENT
Start: 2025-08-21